# Patient Record
Sex: FEMALE | Race: WHITE | Employment: OTHER | URBAN - METROPOLITAN AREA
[De-identification: names, ages, dates, MRNs, and addresses within clinical notes are randomized per-mention and may not be internally consistent; named-entity substitution may affect disease eponyms.]

---

## 2017-01-04 DIAGNOSIS — I47.19 NARROW COMPLEX TACHYCARDIA (H): Primary | ICD-10-CM

## 2017-01-04 RX ORDER — DILTIAZEM HYDROCHLORIDE 240 MG/1
CAPSULE, COATED, EXTENDED RELEASE ORAL
Qty: 90 CAPSULE | Refills: 3 | Status: SHIPPED | OUTPATIENT
Start: 2017-01-04 | End: 2017-12-25

## 2017-04-08 ENCOUNTER — HOSPITAL ENCOUNTER (OUTPATIENT)
Facility: CLINIC | Age: 82
Setting detail: OBSERVATION
Discharge: HOME | End: 2017-04-09
Attending: EMERGENCY MEDICINE | Admitting: INTERNAL MEDICINE
Payer: MEDICARE

## 2017-04-08 DIAGNOSIS — K56.41 IMPACTED STOOL IN RECTUM (H): ICD-10-CM

## 2017-04-08 DIAGNOSIS — K59.00 CONSTIPATION: Primary | ICD-10-CM

## 2017-04-08 LAB
ANION GAP SERPL CALCULATED.3IONS-SCNC: 7 MMOL/L (ref 3–14)
BASOPHILS # BLD AUTO: 0.1 10E9/L (ref 0–0.2)
BASOPHILS NFR BLD AUTO: 0.4 %
BUN SERPL-MCNC: 15 MG/DL (ref 7–30)
CALCIUM SERPL-MCNC: 9 MG/DL (ref 8.5–10.1)
CHLORIDE SERPL-SCNC: 104 MMOL/L (ref 94–109)
CO2 SERPL-SCNC: 29 MMOL/L (ref 20–32)
CREAT SERPL-MCNC: 0.88 MG/DL (ref 0.52–1.04)
DIFFERENTIAL METHOD BLD: ABNORMAL
EOSINOPHIL # BLD AUTO: 0.1 10E9/L (ref 0–0.7)
EOSINOPHIL NFR BLD AUTO: 0.6 %
ERYTHROCYTE [DISTWIDTH] IN BLOOD BY AUTOMATED COUNT: 13.4 % (ref 10–15)
GFR SERPL CREATININE-BSD FRML MDRD: 60 ML/MIN/1.7M2
GLUCOSE SERPL-MCNC: 134 MG/DL (ref 70–99)
HCT VFR BLD AUTO: 43.9 % (ref 35–47)
HGB BLD-MCNC: 14.1 G/DL (ref 11.7–15.7)
IMM GRANULOCYTES # BLD: 0.1 10E9/L (ref 0–0.4)
IMM GRANULOCYTES NFR BLD: 0.5 %
LYMPHOCYTES # BLD AUTO: 1.2 10E9/L (ref 0.8–5.3)
LYMPHOCYTES NFR BLD AUTO: 9.3 %
MCH RBC QN AUTO: 30.1 PG (ref 26.5–33)
MCHC RBC AUTO-ENTMCNC: 32.1 G/DL (ref 31.5–36.5)
MCV RBC AUTO: 94 FL (ref 78–100)
MONOCYTES # BLD AUTO: 1 10E9/L (ref 0–1.3)
MONOCYTES NFR BLD AUTO: 7.8 %
NEUTROPHILS # BLD AUTO: 10.4 10E9/L (ref 1.6–8.3)
NEUTROPHILS NFR BLD AUTO: 81.4 %
NRBC # BLD AUTO: 0 10*3/UL
NRBC BLD AUTO-RTO: 0 /100
PLATELET # BLD AUTO: 242 10E9/L (ref 150–450)
POTASSIUM SERPL-SCNC: 3.7 MMOL/L (ref 3.4–5.3)
RBC # BLD AUTO: 4.68 10E12/L (ref 3.8–5.2)
SODIUM SERPL-SCNC: 140 MMOL/L (ref 133–144)
WBC # BLD AUTO: 12.8 10E9/L (ref 4–11)

## 2017-04-08 PROCEDURE — 25000132 ZZH RX MED GY IP 250 OP 250 PS 637: Mod: GY

## 2017-04-08 PROCEDURE — 80048 BASIC METABOLIC PNL TOTAL CA: CPT | Performed by: EMERGENCY MEDICINE

## 2017-04-08 PROCEDURE — 25000132 ZZH RX MED GY IP 250 OP 250 PS 637: Mod: GY | Performed by: EMERGENCY MEDICINE

## 2017-04-08 PROCEDURE — 85025 COMPLETE CBC W/AUTO DIFF WBC: CPT | Performed by: EMERGENCY MEDICINE

## 2017-04-08 PROCEDURE — A9270 NON-COVERED ITEM OR SERVICE: HCPCS | Mod: GY | Performed by: EMERGENCY MEDICINE

## 2017-04-08 PROCEDURE — A9270 NON-COVERED ITEM OR SERVICE: HCPCS | Mod: GY

## 2017-04-08 PROCEDURE — 99285 EMERGENCY DEPT VISIT HI MDM: CPT

## 2017-04-08 PROCEDURE — 93005 ELECTROCARDIOGRAM TRACING: CPT

## 2017-04-08 RX ORDER — MAGNESIUM CARB/ALUMINUM HYDROX 105-160MG
30 TABLET,CHEWABLE ORAL ONCE
Status: COMPLETED | OUTPATIENT
Start: 2017-04-08 | End: 2017-04-09

## 2017-04-08 RX ORDER — ADENOSINE 3 MG/ML
INJECTION, SOLUTION INTRAVENOUS
Status: DISCONTINUED
Start: 2017-04-08 | End: 2017-04-08 | Stop reason: WASHOUT

## 2017-04-08 RX ADMIN — Medication 286 ML: at 23:07

## 2017-04-08 RX ADMIN — DOCUSATE SODIUM 286 ML: 50 LIQUID ORAL at 20:42

## 2017-04-08 RX ADMIN — DOCUSATE SODIUM 286 ML: 50 LIQUID ORAL at 19:26

## 2017-04-08 RX ADMIN — DOCUSATE SODIUM 286 ML: 50 LIQUID ORAL at 23:07

## 2017-04-08 ASSESSMENT — ENCOUNTER SYMPTOMS
DYSURIA: 0
FEVER: 0
CONSTIPATION: 1
BLOOD IN STOOL: 0
HEMATURIA: 0

## 2017-04-08 NOTE — IP AVS SNAPSHOT
[facility] Observation Department    [address]    [facility] [address]    Phone:  [phone]                                       After Visit Summary   4/8/2017    Terese Nassar    MRN: [id number]           After Visit Summary Signature Page     I have received my discharge instructions, and my questions have been answered. I have discussed any challenges I see with this plan with the nurse or doctor.    ..........................................................................................................................................  Patient/Patient Representative Signature      ..........................................................................................................................................  Patient Representative Print Name and Relationship to Patient    ..................................................               ................................................  Date                                            Time    ..........................................................................................................................................  Reviewed by Signature/Title    ...................................................              ..............................................  Date                                                            Time

## 2017-04-08 NOTE — ED NOTES
Patient presents to the ED with constipation. Reports no BM x 1 week. Seen at urgent care earlier today and given something to take orally. Told if no results by tonight to come to ED.

## 2017-04-08 NOTE — IP AVS SNAPSHOT
MRN:[id number]                      After Visit Summary   4/8/2017    Terese Nassar    MRN: [id number]           Thank you!     Thank you for choosing [facility] for your care. Our goal is always to provide you with excellent care. Hearing back from our patients is one way we can continue to improve our services. Please take a few minutes to complete the written survey that you may receive in the mail after you visit. If you would like to speak to someone directly about your visit please contact Patient Relations at [phone]. Thank you!          Patient Information     Date Of Birth          8/7/1928        About your hospital stay     You were admitted on:  April 9, 2017 You last received care in the:  [facility] Observation Department    You were discharged on:  April 9, 2017        Reason for your hospital stay       Constipation. Received multiple stool softeners, laxatives and enemas with dramatic results. No stools on day of discharge.                  Who to Call     For medical emergencies, please call 911.  For non-urgent questions about your medical care, please call your primary care provider or clinic, [phone]          Attending Provider     Provider Specialty    Deshawn Lozano MD Emergency Medicine    [facility], Kodi Gonzales MD Internal Medicine       Primary Care Provider Office Phone # Fax #    Alyson Hector [phone]       [facility] [id number] [place] [phone]         When to contact your care team       Call your primary doctor if you have any of the following: temperature greater than 101, increased pain or profound constipation.                  After Care Instructions     Activity       Your activity upon discharge: activity as tolerated            Diet       Follow this diet upon discharge: Regular, encourage oral hydration                  Follow-up Appointments     Follow-up and  "recommended labs and tests        Follow up with primary care provider, Alyson Hector, within 7 days for hospital follow- up.  No follow up labs or test are needed.  Pt reports taking Miralax daily at home, recommend resuming Miralax in 1-2 days. If no bowel movement for 2 days, recommend increasing Miralax to twice daily until you have a bowel movement then resume daily dosing.   Encourage oral hydration                             Pending Results     No orders found for last 3 day(s).            Statement of Approval     Ordered          17 1118  I have reviewed and agree with all the recommendations and orders detailed in this document.  EFFECTIVE NOW     Approved and electronically signed by:  Mignon Su PA-C             Admission Information     Date & Time Provider Department Dept. Phone    2017 Kodi Amaya MD [facility] Observation Department [phone]      Your Vitals Were     Blood Pressure Pulse Temperature Respirations Height Weight    116/52 (BP Location: Right arm) 99 98.5  F (36.9  C) (Oral) 16 1.524 m (5') 54.9 kg (121 lb)    Pulse Oximetry BMI (Body Mass Index)                93% 23.63 kg/m2          MyChart Information     TrackR lets you send messages to your doctor, view your test results, renew your prescriptions, schedule appointments and more. To sign up, go to [email or web address] . Click on \"Log in\" on the left side of the screen, which will take you to the Welcome page. Then click on \"Sign up Now\" on the right side of the page.     You will be asked to enter the access code listed below, as well as some personal information. Please follow the directions to create your username and password.     Your access code is: [email or web address]  Expires: 2017 12:20 PM     Your access code will  in 90 days. If you need help or a new code, please call your [facility] clinic or [phone].        Care EveryWhere ID     This is your Care " EveryWhere ID. This could be used by other organizations to access your [facility] medical records  [id number]           Review of your medicines      CONTINUE these medicines which have NOT CHANGED        Dose / Directions    diltiazem 240 MG 24 hr capsule   Commonly known as:  CARDIZEM CD   Used for:  Narrow complex tachycardia (H)        Take 1 capsule daily   Quantity:  90 capsule   Refills:  3       hydrocortisone 2.5 % cream   Commonly known as:  ANUSOL-HC        Dose:  1 Application   Place 1 Application rectally 3 times daily as needed for itching   Refills:  0       MIRALAX powder   Generic drug:  polyethylene glycol        Dose:  1 capful   Take 17 g (1 capful) by mouth daily   Quantity:  510 g   Refills:  1       simvastatin 10 MG tablet   Commonly known as:  ZOCOR        Dose:  10 mg   Take 10 mg by mouth At Bedtime   Refills:  0                Protect others around you: Learn how to safely use, store and throw away your medicines at www.disposemymeds.org.             Medication List: This is a list of all your medications and when to take them. Check marks below indicate your daily home schedule. Keep this list as a reference.      Medications           Morning Afternoon Evening Bedtime As Needed    diltiazem 240 MG 24 hr capsule   Commonly known as:  CARDIZEM CD   Take 1 capsule daily   Last time this was given:  240 mg on 4/9/2017 11:34 AM                                hydrocortisone 2.5 % cream   Commonly known as:  ANUSOL-HC   Place 1 Application rectally 3 times daily as needed for itching                                MIRALAX powder   Take 17 g (1 capful) by mouth daily   Generic drug:  polyethylene glycol                                simvastatin 10 MG tablet   Commonly known as:  ZOCOR   Take 10 mg by mouth At Bedtime

## 2017-04-09 ENCOUNTER — APPOINTMENT (OUTPATIENT)
Dept: GENERAL RADIOLOGY | Facility: CLINIC | Age: 82
End: 2017-04-09
Attending: EMERGENCY MEDICINE
Payer: MEDICARE

## 2017-04-09 VITALS
RESPIRATION RATE: 16 BRPM | OXYGEN SATURATION: 93 % | HEIGHT: 60 IN | HEART RATE: 99 BPM | BODY MASS INDEX: 23.75 KG/M2 | TEMPERATURE: 97.6 F | SYSTOLIC BLOOD PRESSURE: 141 MMHG | DIASTOLIC BLOOD PRESSURE: 65 MMHG | WEIGHT: 121 LBS

## 2017-04-09 PROBLEM — K59.00 CONSTIPATION: Status: ACTIVE | Noted: 2017-04-09

## 2017-04-09 LAB
INTERPRETATION ECG - MUSE: NORMAL
LACTATE BLD-SCNC: 2.1 MMOL/L (ref 0.7–2.1)
LACTATE BLD-SCNC: 2.2 MMOL/L (ref 0.7–2.1)

## 2017-04-09 PROCEDURE — G0378 HOSPITAL OBSERVATION PER HR: HCPCS

## 2017-04-09 PROCEDURE — 96360 HYDRATION IV INFUSION INIT: CPT

## 2017-04-09 PROCEDURE — 83605 ASSAY OF LACTIC ACID: CPT | Performed by: INTERNAL MEDICINE

## 2017-04-09 PROCEDURE — 25000128 H RX IP 250 OP 636: Performed by: INTERNAL MEDICINE

## 2017-04-09 PROCEDURE — 83605 ASSAY OF LACTIC ACID: CPT | Performed by: PHYSICIAN ASSISTANT

## 2017-04-09 PROCEDURE — 25000132 ZZH RX MED GY IP 250 OP 250 PS 637: Mod: GY | Performed by: INTERNAL MEDICINE

## 2017-04-09 PROCEDURE — 36415 COLL VENOUS BLD VENIPUNCTURE: CPT | Performed by: INTERNAL MEDICINE

## 2017-04-09 PROCEDURE — 96361 HYDRATE IV INFUSION ADD-ON: CPT

## 2017-04-09 PROCEDURE — 25000132 ZZH RX MED GY IP 250 OP 250 PS 637: Mod: GY | Performed by: EMERGENCY MEDICINE

## 2017-04-09 PROCEDURE — A9270 NON-COVERED ITEM OR SERVICE: HCPCS | Mod: GY | Performed by: INTERNAL MEDICINE

## 2017-04-09 PROCEDURE — 99236 HOSP IP/OBS SAME DATE HI 85: CPT | Performed by: INTERNAL MEDICINE

## 2017-04-09 PROCEDURE — 74020 XR ABDOMEN 2 VW: CPT

## 2017-04-09 PROCEDURE — 36415 COLL VENOUS BLD VENIPUNCTURE: CPT | Performed by: PHYSICIAN ASSISTANT

## 2017-04-09 RX ORDER — POLYETHYLENE GLYCOL 3350 17 G/17G
1 POWDER, FOR SOLUTION ORAL DAILY
Qty: 510 G | Refills: 1 | COMMUNITY
Start: 2017-04-09

## 2017-04-09 RX ORDER — DILTIAZEM HYDROCHLORIDE 240 MG/1
240 CAPSULE, COATED, EXTENDED RELEASE ORAL DAILY
Status: DISCONTINUED | OUTPATIENT
Start: 2017-04-09 | End: 2017-04-09 | Stop reason: HOSPADM

## 2017-04-09 RX ORDER — ONDANSETRON 2 MG/ML
4 INJECTION INTRAMUSCULAR; INTRAVENOUS EVERY 6 HOURS PRN
Status: DISCONTINUED | OUTPATIENT
Start: 2017-04-09 | End: 2017-04-09 | Stop reason: HOSPADM

## 2017-04-09 RX ORDER — NALOXONE HYDROCHLORIDE 0.4 MG/ML
.1-.4 INJECTION, SOLUTION INTRAMUSCULAR; INTRAVENOUS; SUBCUTANEOUS
Status: DISCONTINUED | OUTPATIENT
Start: 2017-04-09 | End: 2017-04-09 | Stop reason: HOSPADM

## 2017-04-09 RX ORDER — ACETAMINOPHEN 325 MG/1
650 TABLET ORAL EVERY 4 HOURS PRN
Status: DISCONTINUED | OUTPATIENT
Start: 2017-04-09 | End: 2017-04-09 | Stop reason: HOSPADM

## 2017-04-09 RX ORDER — DILTIAZEM HYDROCHLORIDE 30 MG/1
60 TABLET, FILM COATED ORAL ONCE
Status: COMPLETED | OUTPATIENT
Start: 2017-04-09 | End: 2017-04-09

## 2017-04-09 RX ORDER — ONDANSETRON 4 MG/1
4 TABLET, ORALLY DISINTEGRATING ORAL EVERY 6 HOURS PRN
Status: DISCONTINUED | OUTPATIENT
Start: 2017-04-09 | End: 2017-04-09 | Stop reason: HOSPADM

## 2017-04-09 RX ORDER — SIMVASTATIN 10 MG
10 TABLET ORAL AT BEDTIME
Status: DISCONTINUED | OUTPATIENT
Start: 2017-04-09 | End: 2017-04-09 | Stop reason: HOSPADM

## 2017-04-09 RX ADMIN — SODIUM CHLORIDE 1000 ML: 9 INJECTION, SOLUTION INTRAVENOUS at 02:17

## 2017-04-09 RX ADMIN — MAGNESIUM CITRATE 30 ML: 1.75 LIQUID ORAL at 00:26

## 2017-04-09 RX ADMIN — DILTIAZEM HYDROCHLORIDE 60 MG: 30 TABLET, FILM COATED ORAL at 02:12

## 2017-04-09 RX ADMIN — DILTIAZEM HYDROCHLORIDE 240 MG: 240 CAPSULE, EXTENDED RELEASE ORAL at 11:34

## 2017-04-09 NOTE — PLAN OF CARE
Problem: Discharge Planning  Goal: Discharge Planning (Adult, OB, Behavioral, Peds)  Patient abdomen still distended after many episodes of loose stools. Patient tried to void but was only able to void out 75 mls. Bladder scan showed 405mls. Patient was straight cathed for 700. Urine was norberto in color, slighlty cloudy.

## 2017-04-09 NOTE — PROGRESS NOTES
[facility]    Sepsis Evaluation Progress Note    Date of Service: 04/09/2017    I was called to see Terese Nassar due to abnormal vital signs triggering the Sepsis SIRS screening alert. She is not known to have an infection.     Physical Exam    Vital Signs:  Temp: 96.2  F (35.7  C) Temp src: Temporal BP: 147/67 Pulse: 99 Heart Rate: 88 Resp: 16 SpO2: 97 % O2 Device: None (Room air)      Lab:  Lactic Acid   Date Value Ref Range Status   04/09/2017 2.2 (H) 0.7 - 2.1 mmol/L Final     Patient just examined in ED  The patient is at baseline mental status.    The rest of their physical exam is significant for NSR, lungs clear, no abdominal tenderness, A&O.    Assessment and Plan    The SIRS and exam findings are likely due to previous run of SVT and mild hypovolemia, there is no sign of sepsis at this time.  Patient has intermittent runs of SVT chronically which may trigger the sepsis protocol.  She is here for constipation and has no sign of infection.    Disposition: The patient will remain on the current unit. We will continue to monitor this patient closely.    Kodi Amaya MD

## 2017-04-09 NOTE — PHARMACY-ADMISSION MEDICATION HISTORY
Admission medication history interview status for this patient is complete. See [facility] admission navigator for allergy information, prior to admission medications and immunization status.     Medication history interview source(s):Patient  Medication history resources (including written lists, pill bottles, clinic record):[facility] list  Primary pharmacy:[facility]    Changes made to [facility] medication list:  Added: None  Deleted: prilosec  Changed: None    Actions taken by pharmacist (provider contacted, etc):None     Additional medication history information:None    Medication reconciliation/reorder completed by provider prior to medication history? No    For patients on insulin therapy: No (Yes/No)  Lantus/levemir/NPH/Mix 70/30 dose:  _____   in AM/PM  or twice daily   Sliding scale Novolog Y/N  If Yes, do you have a baseline novolog pre-meal dose:  ______units with meals   Patients eat three meals a day:   Y/N     Any Barriers to therapy:  cost of medications/comfortable with giving injections (if applicable)/ comfortable and confident with current diabetes regimen       Prior to Admission medications    Medication Sig Last Dose Taking? Auth Provider   hydrocortisone (ANUSOL-HC) 2.5 % cream Place 1 Application rectally 3 times daily as needed for itching Past Week at Unknown time Yes Reported, Patient   diltiazem (CARDIZEM CD) 240 MG 24 hr capsule Take 1 capsule daily 4/7/2017 at Unknown time Yes Kane Galaviz MD   simvastatin (ZOCOR) 10 MG tablet Take 10 mg by mouth At Bedtime 4/7/2017 at Unknown time Yes Unknown, Entered By History

## 2017-04-09 NOTE — PLAN OF CARE
Problem: Discharge Planning  Goal: Discharge Planning (Adult, OB, Behavioral, Peds)  OBSERVATION patient END time: 1320

## 2017-04-09 NOTE — PLAN OF CARE
Problem: Discharge Planning  Goal: Discharge Planning (Adult, OB, Behavioral, Peds)  Patient triggered sepsis lactic acid came back at 2.2. IV bolus started. Tele monitoring in place. Tele tech reports hr in the 150's - 160's. BP 1518/1131. Hospitalist notified. Cardizem given at 0212.

## 2017-04-09 NOTE — PLAN OF CARE
Problem: Discharge Planning  Goal: Discharge Planning (Adult, OB, Behavioral, Peds)  Outcome: No Change  ROOM # 0120     Living Situation (if not independent, order SW consult): Ind senior living  Facility name: [facility]     Activity level at baseline: Ind with walker  Activity level on admit: SBA with walker     Is patient a falls risk? Yes  Reason for falls risk:  Mobility  Falls armband on? YES,   Within Arm's Reach? Yes   Bed alarm turned on?   No,   Personal alarm in place and turned on?   No,      Patient registered to observation; given Patient Bill of Rights; given the opportunity to ask questions about observation status and their plan of care.  Patient has been oriented to the observation room, bathroom and call light is in place.     : daughter

## 2017-04-09 NOTE — PLAN OF CARE
Problem: Discharge Planning  Goal: Discharge Planning (Adult, OB, Behavioral, Peds)  PRIMARY DIAGNOSIS: constipation   Primary Symptoms: constipation - 1wk, fatique      OUTPATIENT/OBSERVATION GOALS TO BE MET BEFORE DISCHARGE:      1. Orthostatic symptoms (BP decrease or HR increase with patient upright)? N/A  2. Vital Signs stable? Yes- pulse remains in 80s  3. Documented urine output: yes  4. Tolerating PO fluid: yes  5. Symptoms improved: constipation resolved loose stools overnight. None on day shift  6. Labs WNL? Yes- lactic 2.1  7. ADLs back to baseline? Yes  8. Activity and level of assistance: Up with standby assistance and walker  9. Pain status: Pain free.  10. Barriers to discharge noted No      Interpretation of rhythm per telemetry tech: NSR 80s     Patient has not had any stools since overnight. Minimal hand pain from arthritis. Moving SBA with walker. Was able to ambulate halls this a.m. Will discharge at 3 with daughter.

## 2017-04-09 NOTE — PLAN OF CARE
Problem: Discharge Planning  Goal: Discharge Planning (Adult, OB, Behavioral, Peds)  Outcome: Improving  PRIMARY DIAGNOSIS: constipation   Primary Symptoms: constipation - 1wk, fatique     OUTPATIENT/OBSERVATION GOALS TO BE MET BEFORE DISCHARGE:     1. Orthostatic symptoms (BP decrease or HR increase with patient upright)?  N/A  2. Vital Signs stable? No HR ranging from 150-160's at times. BP elevated during that time.   3. Documented urine output: No  4. Tolerating PO fluid: only had water since arriving.   5. Symptoms improved: constipation resolved having multiple loose stools  6. Labs WNL? No- sepsis protocol flagged, LA ordered  7. ADLs back to baseline?  Yes  8. Activity and level of assistance: Up with standby assistance and walker  9. Pain status: Pain free.  10. Barriers to discharge noted No     Interpretation of rhythm per telemetry tech: Tele reports SR with frequent PVC's     Patient is alert and oriented x4, up with SBA to commode. Patient came into ED d/t constipation, 3 pink lady enemas given in ED. On arrival to unit patient started have loose stools. Bruise noted on right shoulder and lower back. Patient reports falling 2 weeks ago. IV site saline locked. Tele monitoring in place. Will continue t monitor, assess, and offer supportive cares.

## 2017-04-09 NOTE — H&P
[facility]  Hospitalist Admission Note  Name: Terese Nassar    MRN: [id number]  YOB: 1928    Age: 88 year old  Date of admission: 4/8/2017  Primary care provider: Alyson Hector    Chief Complaint:  Constipation    Assessment and Plan:   1.   Constipation: chronic issue for patient.  Last BM one week ago.  Tried milk of magnesia at home without relief.  3 pink lady enemas and attempted fecal disimpaction in ED without much output, however stools felt in rectal vault  Xray without sign of SBO and no abdominal pain, nausea, or vomiting so feel comfortable continuing to give po agents.  Just given milk of magnesia in ED.  Ideally we can get the stool cleaned out and home later today.  -ordered 2L of Golytely to be given if milk of magnesia not effective in the next hour or two.  If substantial output do not need to complete all 2 liters  -prn fleet enema also available, but can wait until morning as she just had 3 of them    2.   Hemorrhoids: small amount of rectal bleeding recently and hemorrhoids on exam.  Has hydrocortisone cream prn.  Not having significant blood loss and hemoglobin in normal range.  -continue pta topical hydrocortisone cream prn for rectal irritation    3.   Paroxysmal SVT: has had frequent episodes of SVT for at least 5 years.  Has been on metoprolol in the past and now on 240mg daily dilitiazem CR.  SVT to rates of 140s here that other than some palpitations are asymptomatic.  Usually happen up to once per day and last up to an hour.  Per care everywhere this has been ongoing for years and has been seen by cardiology.  Per patient they have said she does not have to worry about this.  I cannot find this last note.  Spontaneously converted in ED with valsalva for BM.  Did not take her diltiazem today as likely etiology for more frequent episodes today.  -give dose of 60mg po short acting diltiazem on admission then resume her 240mg diltiazem CR in morning  -will  monitor on telemetry to see how frequent these episodes are occurring.  They seem to be self terminating and asymptomatic.  Will have her valsalva if not self-terminating and back up would be adenosine if needed  -have recommended she see her cardiologist again after discharge as it has been a year and these episodes are happening every day     4.   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    5.   HTN: SBP here 120's.  Continue pta diltiazem CR 240mg daily.    6.   HLD: continue pta simvastatin    7.   GERD: continue pta prilosec    DVT Prophylaxis: Low Risk/Ambulatory with no VTE prophylaxis indicated, ambulate, observation patient  Code Status: DNR / DNI  FEN: regular diet  Discharge Dispo: home  Estimated Disch Date / # of Days until Disch: admit to observation for constipation.  Likely discharge within 24-48 hrs      History of Present Illness:  Terese Nassar is a 88 year old female with PMH including HTN, HLD, constipation, paroxysmal SVT, mild MR/AR, and GERD who presents with constipation.  She has not had a bowel movement in one week.  She has been trying to go and was diagnosed with hemorrhoids earlier this week by her pcp.  She was given hydrocortisone for rectal irritation which is helping.  She denies any abdominal pain with this.  No nausea or vomiting.  Small amount of bright red blood once per rectum once in awhile.  She has not felt like eating much because she feels full.  Denies any diarrhea.  Does have hx of constipation in the past.  No recent unintentional weight loss.  She does admit to frequent episodes of a rapid heart rate over the past few years.  Her HR is always fast when she is anxious or in pain.  It goes away by itself usually within one hour.  Once in awhile will have palpitations, but does not have chest pain, sob, or light headedness with these episodes.  Has seen cardiology for this and she is on diltiazem which she did not take today.    History obtained from patient,  medical record, and from Dr. Lozano in the emergency department.  [place] lady enema x 3 withotu significant stool output.  Disimpaction was also attempted without good results.  Stools was felt in rectal vault.  To receive milk of mag.  Admit to observation to help with constipation.  Abdominal xray requested, does not appear to have SBO on my read just a large stool burden.     Past Medical History reviewed:  Past Medical History:   Diagnosis Date     Basal cell carcinoma 2014    on scalp     Cataract      GERD (gastroesophageal reflux disease)      HLD (hyperlipidemia)      Hypertension      Impaired fasting glucose 2005     Mitral valve insufficiency     SBE prophylaxis per PMD     Narrow complex tachycardia (H) 2010     Palpitations      Past Surgical History reviewed:  Past Surgical History:   Procedure Laterality Date     CARDIAC SURGERY       ESOPHAGOSCOPY, GASTROSCOPY, DUODENOSCOPY (EGD), COMBINED N/A 10/16/2016    Procedure: COMBINED ESOPHAGOSCOPY, GASTROSCOPY, DUODENOSCOPY (EGD), BIOPSY SINGLE OR MULTIPLE;  Surgeon: Kane Salazar MD;  Location:  GI     Social History reviewed:  Social History   Substance Use Topics     Smoking status: Former Smoker     Smokeless tobacco: Former User     Alcohol use Yes     Social History     Social History Narrative     Family History reviewed:  Family History   Problem Relation Age of Onset     DIABETES Mother      Unknown/Adopted Father      Allergies:  Allergies   Allergen Reactions     Sulfa Drugs Nausea     Medications:    (Not in a hospital admission)  Review of Systems:  A Comprehensive greater than 10 system review of systems was carried out.  Pertinent positives and negatives are noted above.  Otherwise negative.     Physical Exam:  Blood pressure 113/70, pulse 99, temperature 97.6  F (36.4  C), resp. rate 14, SpO2 98 %.  Wt Readings from Last 1 Encounters:   10/15/16 53.1 kg (117 lb)     Exam:  Constitutional: Awake, NAD  Eyes: sclera white   HEENT:  atraumatic, MMM  Respiratory: no respiratory distress, lungs cta bilaterally, no crackles or wheeze  Cardiovascular: RRR.  1/6 JASMINA heard at RUSB  GI: soft, non-tender, not distended, bowel sounds present  Skin: no rash or lesions, acyanotic  Musculoskeletal/extremities: atraumatic, no major deformities. Trace to 1+ bilateral pedal edema  Neurologic: A&O, speech clear, strength and light touch sensation grossly normal  Psychiatric: calm, cooperative, normal affect    Lab and imaging data personally reviewed:  Labs:    Recent Labs  Lab 04/08/17 2029   WBC 12.8*   HGB 14.1   HCT 43.9   MCV 94          Recent Labs  Lab 04/08/17 2029      POTASSIUM 3.7   CHLORIDE 104   CO2 29   ANIONGAP 7   *   BUN 15   CR 0.88   GFRESTIMATED 60*   GFRESTBLACK 73   IWONA 9.0       EKG:   Narrow complex SVT with 's    Imaging:  Recent Results (from the past 24 hour(s))   XR Abdomen 2 Views    Narrative    XR ABDOMEN 2 VW   4/9/2017 12:24 AM     INDICATION: Abdominal pain.    COMPARISON: 2/25/2013.      Impression    IMPRESSION: Nonspecific bowel gas pattern. No evidence of bowel  obstruction. No free air. Small presumed phleboliths in the pelvis.  Stable exostosis or hypertrophic spur in the right innominate bone  above the acetabulum.    MD Kodi SOTO MD  Hospitalist  [facility]

## 2017-04-09 NOTE — PROGRESS NOTES
Reviewed her telemetry from overnight.  2 other episodes of SVT lasting each about 20-30 minutes with rates 140s.  Both occurred while up to bathroom to have a BM.  Both self terminated back to NSR with HR 70s.  Hopefully with her diltiazem on board there will be less episodes, but per patient this happens every day.

## 2017-04-09 NOTE — DISCHARGE SUMMARY
[facility] Outpatient / Observation Unit  Discharge Summary        Terese Nassar MRN# [id number]   YOB: 1928 Age: 88 year old     Date of Admission:  4/8/2017  Date of Discharge:  4/9/2017  Admitting Physician:  Kodi Amaya MD  Discharge Physician: Mignon Su PA-C  Discharging Service: Hospitalist      Primary Provider: Alyson Hector  Primary Care Physician Phone Number: [phone]         Primary Discharge Diagnoses:    Terese Nassar was admitted on 4/8/2017 for concerns of constipation.    1. Constipation - resolved. Received multiple stool softeners, laxatives and enemas with dramatic results. No stools on day of discharge. No abdominal pain, tolerating PO intake. Pt reports taking Miralax daily at home, recommend resuming in 1-2 days. If she does not have a bowel movement for 2 days, recommend taking Miralax twice daily until she has a bowel movement then resume daily dosing. Encourage PO hydration.   2. Paroxysmal SVT - chronic issue for her, typically spontaneously resolves. On Diltiazem, continue. Recommend follow up with cardiologist regarding frequency of SVT        Secondary Discharge Diagnoses:     Past Medical History:   Diagnosis Date     Basal cell carcinoma 2014    on scalp     Cataract      GERD (gastroesophageal reflux disease)      HLD (hyperlipidemia)      Hypertension      Impaired fasting glucose 2005     Mitral valve insufficiency     SBE prophylaxis per PMD     Narrow complex tachycardia (H) 2010     Palpitations                 Code Status:      DNR / DNI        Brief Hospital Summary:        Reason for your hospital stay       Constipation. Received multiple stool softeners, laxatives and enemas   with dramatic results. No stools on day of discharge.                    Please refer to initial admission history and physical for further details.   Briefly, Terese Nassar was admitted on 4/8/2017 for concerns of constipation. Work up in ED did not show any  evidence of bowel obstruction. Pt was registered to the Observation Unit for continued supportive therapy for constipation.     Pt was resuscitated with IV fluids and continued on supportive measures including anti-emetics and pain control as needed. She was started on a bowel regimen with dramatic results. Labs were reviewed and significant results addressed.  She had multiple loose stools overnight but on the day of discharge, she had no further bowel movement. She also has several episodes of self terminating SVT while here, which is chronic for her, asymptomatic. Pt was able to tolerate PO intake. Vitals were stable, without evidence of orthostasis. Medications were reviewed and adjustments made as necessary. Pt is instructed to follow up as below.            Significant Lab During Hospitalization:        Recent Labs  Lab 04/08/17 2029   WBC 12.8*   HGB 14.1   HCT 43.9   MCV 94          Recent Labs  Lab 04/08/17 2029      POTASSIUM 3.7   CHLORIDE 104   CO2 29   ANIONGAP 7   *   BUN 15   CR 0.88   GFRESTIMATED 60*   GFRESTBLACK 73   IWONA 9.0       Recent Labs  Lab 04/09/17  1029 04/09/17  0140   LACT 2.1 2.2*                Significant Imaging During Hospitalization:      Recent Results (from the past 48 hour(s))   XR Abdomen 2 Views    Narrative    XR ABDOMEN 2 VW   4/9/2017 12:24 AM     INDICATION: Abdominal pain.    COMPARISON: 2/25/2013.      Impression    IMPRESSION: Nonspecific bowel gas pattern. No evidence of bowel  obstruction. No free air. Small presumed phleboliths in the pelvis.  Stable exostosis or hypertrophic spur in the right innominate bone  above the acetabulum.    JEAN CANTRELL MD              Pending Results:        Unresulted Labs Ordered in the Past 30 Days of this Admission     No orders found for last 61 day(s).              Consultations This Hospital Stay:      No consultations were requested during this admission         Discharge Instructions and Follow-Up:       Follow-up Appointments     Follow-up and recommended labs and tests        Follow up with primary care provider, Alyson Hector, within 7 days   for hospital follow- up.  No follow up labs or test are needed.  Pt reports taking Miralax daily at home, recommend resuming Miralax in 1-2   days. If no bowel movement for 2 days, recommend increasing Miralax to   twice daily until you have a bowel movement then resume daily dosing.   Encourage oral hydration                  Pt instructed to follow up with PCP in 7 days.   Follow-up Labs None        Discharge Disposition:      Discharged to home         Discharge Medications:        Current Discharge Medication List      CONTINUE these medications which have NOT CHANGED    Details   hydrocortisone (ANUSOL-HC) 2.5 % cream Place 1 Application rectally 3 times daily as needed for itching      polyethylene glycol (MIRALAX) powder Take 17 g (1 capful) by mouth daily  Qty: 510 g, Refills: 1    Comments: Start in 1-2 days. If no bowel movement for 2 days, take Miralax twice daily until you have a bowel movement then return to once daily  Associated Diagnoses: Constipation      diltiazem (CARDIZEM CD) 240 MG 24 hr capsule Take 1 capsule daily  Qty: 90 capsule, Refills: 3    Associated Diagnoses: Narrow complex tachycardia (H)      simvastatin (ZOCOR) 10 MG tablet Take 10 mg by mouth At Bedtime                 Allergies:         Allergies   Allergen Reactions     Sulfa Drugs Nausea           Condition and Physical on Discharge:      Discharge condition: Stable   Vitals: Blood pressure 115/55, pulse 99, temperature 98.5  F (36.9  C), temperature source Oral, resp. rate 17, height 1.524 m (5'), weight 54.9 kg (121 lb), SpO2 94 %.  121 lbs 0 oz      GENERAL:  Comfortable.  PSYCH: pleasant, oriented, No acute distress.  HEART:  RRR.  LUNGS:  Normal Respiratory effort  EXTREMITIES:  Able to ambulate independently  SKIN:  Dry to touch, No rash, wound or ulcerations.  NEUROLOGIC:   Grossly intact    Mignon Su PA-C

## 2017-04-09 NOTE — PLAN OF CARE
Problem: Discharge Planning  Goal: Discharge Planning (Adult, OB, Behavioral, Peds)  Outcome: Improving  PRIMARY DIAGNOSIS: constipation   Primary Symptoms: constipation - 1wk, fatique     OUTPATIENT/OBSERVATION GOALS TO BE MET BEFORE DISCHARGE:     1. Orthostatic symptoms (BP decrease or HR increase with patient upright)?  N/A  2. Vital Signs stable? No HR ranging from 150-160's at times. BP elevated during that time.   3. Documented urine output: No  4. Tolerating PO fluid: only had water since arriving.   5. Symptoms improved: constipation resolved having multiple loose stools- fecal collection bag in place  6. Labs WNL? No- sepsis protocol flagged, LA 2.2, IV bolus ordered.   7. ADLs back to baseline?  Yes  8. Activity and level of assistance: Up with standby assistance and walker  9. Pain status: Pain free.  10. Barriers to discharge noted No     Interpretation of rhythm per telemetry tech: Tele reports SR with frequent PVC's     Patient is alert and oriented x4, up with SBA to commode. Patient has had several loose incontinent stools this shift. Barrier cream applied to bottom d/t redness. Fecal collection bag inserted around 0430 d/t patient's bottom becoming very red and painful.  IV bolus infusing. Tele monitoring in place. HR at times will jump to 150- 160's when OOB.  Will continue to monitor, assess, and offer supportive cares.

## 2017-04-09 NOTE — PLAN OF CARE
Problem: Discharge Planning  Goal: Discharge Planning (Adult, OB, Behavioral, Peds)  Patient's After Visit Summary was reviewed with patient  Patient verbalized understanding of After Visit Summary, recommended follow up and was given an opportunity to ask questions.   Discharge medications sent home with patient/family: n/a  Discharged with son in law

## 2017-04-09 NOTE — PLAN OF CARE
Problem: Discharge Planning  Goal: Discharge Planning (Adult, OB, Behavioral, Peds)  PRIMARY DIAGNOSIS: constipation   Primary Symptoms: constipation - 1wk, fatique      OUTPATIENT/OBSERVATION GOALS TO BE MET BEFORE DISCHARGE:      1. Orthostatic symptoms (BP decrease or HR increase with patient upright)? N/A  2. Vital Signs stable? HR in 130s from 7-8 a.m. Decreased down to 80s  3. Documented urine output: yes-had to be straight cath at 0700  4. Tolerating PO fluid: yes  5. Symptoms improved: constipation resolved having multiple loose stools  6. Labs WNL? No- sepsis protocol flagged, LA 2.2, IV bolus given  7. ADLs back to baseline? Yes  8. Activity and level of assistance: Up with standby assistance and walker  9. Pain status: Pain free.  10. Barriers to discharge noted No      Interpretation of rhythm per telemetry tech: NSR in 130s from 0700 to 0800, decreased to 80s afterwards     Patient in 130s from 0700 to 0800. Patient asymptomatic and denies chest pain, SOB, and dizziness. States this is her normal. Decreased down to 80s. Patient has not had any stools since night shift. Did require to be cathed at 0700. 700 out.

## 2017-12-25 DIAGNOSIS — I47.19 NARROW COMPLEX TACHYCARDIA (H): ICD-10-CM

## 2017-12-25 RX ORDER — DILTIAZEM HYDROCHLORIDE 240 MG/1
CAPSULE, COATED, EXTENDED RELEASE ORAL
Qty: 15 CAPSULE | Refills: 0 | Status: SHIPPED | OUTPATIENT
Start: 2017-12-25 | End: 2018-01-12

## 2018-01-12 ENCOUNTER — OFFICE VISIT (OUTPATIENT)
Dept: CARDIOLOGY | Facility: CLINIC | Age: 83
End: 2018-01-12
Payer: COMMERCIAL

## 2018-01-12 VITALS
BODY MASS INDEX: 24.27 KG/M2 | HEART RATE: 60 BPM | HEIGHT: 59 IN | DIASTOLIC BLOOD PRESSURE: 64 MMHG | WEIGHT: 120.4 LBS | SYSTOLIC BLOOD PRESSURE: 106 MMHG

## 2018-01-12 DIAGNOSIS — I47.10 PAROXYSMAL SUPRAVENTRICULAR TACHYCARDIA (H): Primary | ICD-10-CM

## 2018-01-12 DIAGNOSIS — I10 ESSENTIAL HYPERTENSION: ICD-10-CM

## 2018-01-12 DIAGNOSIS — E78.2 MIXED HYPERLIPIDEMIA: ICD-10-CM

## 2018-01-12 DIAGNOSIS — I34.0 NON-RHEUMATIC MITRAL REGURGITATION: ICD-10-CM

## 2018-01-12 DIAGNOSIS — I47.19 NARROW COMPLEX TACHYCARDIA (H): ICD-10-CM

## 2018-01-12 PROCEDURE — 99214 OFFICE O/P EST MOD 30 MIN: CPT | Performed by: INTERNAL MEDICINE

## 2018-01-12 PROCEDURE — 93000 ELECTROCARDIOGRAM COMPLETE: CPT | Performed by: INTERNAL MEDICINE

## 2018-01-12 RX ORDER — DILTIAZEM HYDROCHLORIDE 240 MG/1
CAPSULE, COATED, EXTENDED RELEASE ORAL
Qty: 90 CAPSULE | Refills: 3 | Status: SHIPPED | OUTPATIENT
Start: 2018-01-12 | End: 2019-01-03

## 2018-01-12 NOTE — MR AVS SNAPSHOT
After Visit Summary   1/12/2018    Terese Nassar    MRN: [id number]           Patient Information     Date Of Birth          8/7/1928        Visit Information        Provider Department      1/12/2018 9:45 AM Kane Galaviz MD [facility]        Today's Diagnoses     Paroxysmal supraventricular tachycardia (H)    -  1    Non-rheumatic mitral regurgitation        Essential hypertension        Mixed hyperlipidemia        Narrow complex tachycardia (H)           Follow-ups after your visit        Additional Services     Follow-Up with Cardiac Advanced Practice Provider       PSVT                  Future tests that were ordered for you today     Open Future Orders        Priority Expected Expires Ordered    Holter Monitor 24 hour - Adult Routine 1/19/2018 1/12/2019 1/12/2018    Follow-Up with Cardiac Advanced Practice Provider Routine 1/19/2018 1/12/2019 1/12/2018            Who to contact     If you have questions or need follow up information about today's clinic visit or your schedule please contact [facility] directly at [phone].  Normal or non-critical lab and imaging results will be communicated to you by [employer]hart, letter or phone within 4 business days after the clinic has received the results. If you do not hear from us within 7 days, please contact the clinic through [employer]t or phone. If you have a critical or abnormal lab result, we will notify you by phone as soon as possible.  Submit refill requests through [employer] or call your pharmacy and they will forward the refill request to us. Please allow 3 business days for your refill to be completed.          Additional Information About Your Visit        MyChart Information     [employer] lets you send messages to your doctor, view your test results, renew your prescriptions, schedule appointments and more. To sign up, go to [email or web address] . Click on  "\"Log in\" on the left side of the screen, which will take you to the Welcome page. Then click on \"Sign up Now\" on the right side of the page.     You will be asked to enter the access code listed below, as well as some personal information. Please follow the directions to create your username and password.     Your access code is: [email or web address]  Expires: 2018 10:42 AM     Your access code will  in 90 days. If you need help or a new code, please call your [facility] clinic or [phone].        Care EveryWhere ID     This is your Care EveryWhere ID. This could be used by other organizations to access your [facility] medical records  [id number]        Your Vitals Were     Pulse Height Breastfeeding? BMI (Body Mass Index)          60 1.499 m (4' 11\") No 24.32 kg/m2         Blood Pressure from Last 3 Encounters:   18 106/64   17 141/65   10/16/16 152/84    Weight from Last 3 Encounters:   18 54.6 kg (120 lb 6.4 oz)   17 54.9 kg (121 lb)   10/15/16 53.1 kg (117 lb)              We Performed the Following     EKG 12-lead complete w/read - Clinics          Where to get your medicines      These medications were sent to [facility] [id number] [facility] [address] [place] [id number]     Phone:  [phone]     diltiazem 240 MG 24 hr capsule          Primary Care Provider Office Phone # Fax #    Hetal Trujillo [phone]       [facility] [address] [facility] [address]        Equal Access to Services     FREDA SMITH AH: Olayinka Crespo, eber robert, scott shea. So [facility] [phone].    ATENCIÓN: Si habla español, tiene a zaragoza disposición servicios gratuitos de asistencia lingüística. Llame al [phone].    We comply with applicable federal civil rights laws and [place] laws. We do not discriminate on the basis of race, color, national origin, age, " disability, sex, sexual orientation, or gender identity.            Thank you!     Thank you for choosing [facility]  for your care. Our goal is always to provide you with excellent care. Hearing back from our patients is one way we can continue to improve our services. Please take a few minutes to complete the written survey that you may receive in the mail after your visit with us. Thank you!             Your Updated Medication List - Protect others around you: Learn how to safely use, store and throw away your medicines at www.disposemymeds.org.          This list is accurate as of: 1/12/18 10:42 AM.  Always use your most recent med list.                   Brand Name Dispense Instructions for use Diagnosis    diltiazem 240 MG 24 hr capsule    CARDIZEM CD    90 capsule    Take 1 capsule daily    Narrow complex tachycardia (H)       hydrocortisone 2.5 % cream    ANUSOL-HC     Place 1 Application rectally 3 times daily as needed for itching        MIRALAX powder   Generic drug:  polyethylene glycol     510 g    Take 17 g (1 capful) by mouth daily    Constipation       simvastatin 10 MG tablet    ZOCOR     Take 10 mg by mouth At Bedtime

## 2018-01-12 NOTE — PROGRESS NOTES
HISTORY OF PRESENT ILLNESS:  I got together with Terese Nassar today.  She is a very bright and relatively active and vivacious 89-year-old female.  She is younger than her years physically and mentally.  She has had a history of documented supraventricular tachycardia.  The last I saw her was in 11/2015 -- 3 years ago.  She has not had significant heart disease otherwise.  In the past couple of years, she denies chest pain, palpitations, dizziness, syncope, shortness of breath, orthopnea or lower leg edema.  Quite honestly that sounded quite wonderful.  Her diltiazem was CD at 240 mg a day, which she feels quite comfortable with and has been delighted with how stable she is.  The only other significant medicine is simvastatin 10 mg at bedtime.      Just to show that nothing is ever as easy as you think, she came through the door at our clinic today with a heart rate of 60 and a blood pressure of 106/60.  Twenty minutes later when I came in for my interview and felt her pulse, she was tachycardic at a heart rate of 140.  An EKG confirmed a supraventricular tachycardia at 140 beats a minute with no significant EKG changes otherwise, and she was totally asymptomatic.  Although I was somewhat tempted to ignore the whole thing, I did order a 48-hour Holter monitor to get some clues about how frequently she is having this tachyarrhythmia, even though it is profoundly well tolerated.        She said she has a rare lightheaded spell, but it is rare.  I asked her about decreasing the diltiazem dose, and she was adamant about the fact she was happy with the diltiazem at 240 mg a day and but for the fact I picked up on her PSVT, she was asymptomatic.      REVIEW OF SYSTEMS:  Reviewed in Epic, and I endorse the findings but remarkably negative for a 10-point review.  She has a little bit of vague left hip pain.      PHYSICAL EXAMINATION:   GENERAL:  Well-developed, well-nourished, petite woman at 4 feet 11 and 120 pounds.    VITAL SIGNS:  As noted above.   NECK:  She had no neck vein distention or bruit at 30 degrees.   HEART:  Rapid, without gallop or murmur.   LUNGS:  Clear.   ABDOMEN:  Soft without organomegaly.   EXTREMITIES:  Free of edema.      On the diltiazem, she says she has a touch of constipation but no edema.  She has occasional fleeting lightheadedness, but it is rare.      IMPRESSION:   1.  Documented PSVT at 140 beats a minute but asymptomatic.      PLAN:  24-hour Holter monitor.  We will follow up with her after the monitor has been evaluated and read and discuss options as need be.  If this continues to be so well tolerated, we may be just able to let her live with it, but I would like to get a little better feel for how sustained and how frequent it is.      IMPRESSION:   1.  Documented relatively asymptomatic PSVT.  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   2.  Healthy adult otherwise.   3.  Diltiazem noted.  I thought it was wise to decrease the dose, but she did not want to and I did not fight with her about it.      PLAN:  As above.      cc:   Hetal Trujillo MD    [address]  [address]       Alyson Hector MD    [address]  [address]         MABLE YOUSIF MD, [facility]             D: 2018 10:42   T: 2018 11:53   MT: KEON      Name:     TAIWO HERNANDEZ   MRN:      [id number]        Account:      [id number]   :      1928           Service Date: 2018      Document: [id number]

## 2018-01-12 NOTE — PROGRESS NOTES
"Previously seen in clinic in 2010 after episode of \"narrow complex tachycardia\" which resolved with vagal manuevers and adenosine.  PMH: HTN, HLD, mitral insufficiency, SVT (on diltiazem).    1/12/17 Two year follow-up with Dr. Galaviz. 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 At last office visit, you wanted to decrease diltiazem due to bradycardia, but she preferred not to, so it was kept at one 240 mg 24 hour capsule daily.   She went to [facility] ER on 10/15/17 for black diarrhea x 2 days, guaiac positive. She had esophagitis and hemorrhoids but did not need a transfusion, and was discharged on 10/16. She was also seen at [facility] ER and observation 4/8-4/9 for constipation. She had an episode of SVT while there which converted with vagal maneuvers.    HPI and Plan:   See dictation  PSVT - asymptomatic.      I recommend continuing current treatment plans in the chart.          No orders of the defined types were placed in this encounter.    No orders of the defined types were placed in this encounter.    There are no discontinued medications.      No diagnosis found.    CURRENT MEDICATIONS:  Current Outpatient Prescriptions   Medication Sig Dispense Refill     diltiazem (CARDIZEM CD) 240 MG 24 hr capsule Take 1 capsule daily 15 capsule 0     hydrocortisone (ANUSOL-HC) 2.5 % cream Place 1 Application rectally 3 times daily as needed for itching       polyethylene glycol (MIRALAX) powder Take 17 g (1 capful) by mouth daily 510 g 1     simvastatin (ZOCOR) 10 MG tablet Take 10 mg by mouth At Bedtime         ALLERGIES     Allergies   Allergen Reactions     Sulfa Drugs Nausea       PAST MEDICAL HISTORY:  Past Medical History:   Diagnosis Date     Basal cell carcinoma 2014    on scalp     Cataract      GERD (gastroesophageal reflux disease)      HLD (hyperlipidemia)      Hypertension      Impaired fasting glucose 2005     Mitral valve insufficiency     SBE prophylaxis per PMD     Narrow complex tachycardia (H) 2010     Palpitations  " "      PAST SURGICAL HISTORY:  Past Surgical History:   Procedure Laterality Date     CARDIAC SURGERY       ESOPHAGOSCOPY, GASTROSCOPY, DUODENOSCOPY (EGD), COMBINED N/A 10/16/2016    Procedure: COMBINED ESOPHAGOSCOPY, GASTROSCOPY, DUODENOSCOPY (EGD), BIOPSY SINGLE OR MULTIPLE;  Surgeon: Kane Salazar MD;  Location:  GI       FAMILY HISTORY:  Family History   Problem Relation Age of Onset     DIABETES Mother      Unknown/Adopted Father        SOCIAL HISTORY:  Social History     Social History     Marital status:      Spouse name: N/A     Number of children: N/A     Years of education: N/A     Social History Main Topics     Smoking status: Former Smoker     Smokeless tobacco: Former User     Alcohol use Yes     Drug use: No     Sexual activity: Not Asked     Other Topics Concern     Caffeine Concern No     maybe some iced tea in the summer      Special Diet No     Exercise Yes     tap dance and walk every day      Social History Narrative         Review of Systems:  Skin:  Negative       Eyes:  Positive for glasses    ENT:  Negative      Respiratory:  Negative       Cardiovascular:    palpitations;Positive for;fatigue    Gastroenterology: Negative      Genitourinary:  Negative      Musculoskeletal:  Positive for joint pain L hip pain  Neurologic:  Negative      Psychiatric:  Negative      Heme/Lymph/Imm:  Negative      Endocrine:  Negative        Physical Exam:  Vitals: /64 (BP Location: Right arm, Patient Position: Sitting, Cuff Size: Adult Regular)  Pulse 60  Ht 1.499 m (4' 11\")  Wt 54.6 kg (120 lb 6.4 oz)  Breastfeeding? No  BMI 24.32 kg/m2    Constitutional:           Skin:             Head:           Eyes:           Lymph:      ENT:           Neck:           Respiratory:            Cardiac:                                                           GI:           Extremities and Muscular Skeletal:                 Neurological:           Psych:           Recent Lab Results:  LIPID " RESULTS:  No results found for: CHOL, HDL, LDL, TRIG, CHOLHDLRATIO    LIVER ENZYME RESULTS:  Lab Results   Component Value Date    AST 25 10/15/2016    ALT 29 10/15/2016       CBC RESULTS:  Lab Results   Component Value Date    WBC 12.8 (H) 04/08/2017    RBC 4.68 04/08/2017    HGB 14.1 04/08/2017    HCT 43.9 04/08/2017    MCV 94 04/08/2017    MCH 30.1 04/08/2017    MCHC 32.1 04/08/2017    RDW 13.4 04/08/2017     04/08/2017       BMP RESULTS:  Lab Results   Component Value Date     04/08/2017    POTASSIUM 3.7 04/08/2017    CHLORIDE 104 04/08/2017    CO2 29 04/08/2017    ANIONGAP 7 04/08/2017     (H) 04/08/2017    BUN 15 04/08/2017    CR 0.88 04/08/2017    GFRESTIMATED 60 (L) 04/08/2017    GFRESTBLACK 73 04/08/2017    IWONA 9.0 04/08/2017        A1C RESULTS:  No results found for: A1C    INR RESULTS:  Lab Results   Component Value Date    INR 0.98 10/15/2016    INR 0.92 06/04/2010           CC  Alyson Hector MD  [address] [address]

## 2018-01-12 NOTE — LETTER
1/12/2018      Hetal Trujillo  [facility] [address]  [facility] [address]      RE: Terese Nassar       Dear Colleague,    I had the pleasure of seeing Terese Nassar in the [facility].    HISTORY OF PRESENT ILLNESS:  I got together with Terese Nassar today.  She is a very bright and relatively active and vivacious 89-year-old female.  She is younger than her years physically and mentally.  She has had a history of documented supraventricular tachycardia.  The last I saw her was in 11/2015 -- 3 years ago.  She has not had significant heart disease otherwise.  In the past couple of years, she denies chest pain, palpitations, dizziness, syncope, shortness of breath, orthopnea or lower leg edema.  Quite honestly that sounded quite wonderful.  Her diltiazem was CD at 240 mg a day, which she feels quite comfortable with and has been delighted with how stable she is.  The only other significant medicine is simvastatin 10 mg at bedtime.      Just to show that nothing is ever as easy as you think, she came through the door at our clinic today with a heart rate of 60 and a blood pressure of 106/60.  Twenty minutes later when I came in for my interview and felt her pulse, she was tachycardic at a heart rate of 140.  An EKG confirmed a supraventricular tachycardia at 140 beats a minute with no significant EKG changes otherwise, and she was totally asymptomatic.  Although I was somewhat tempted to ignore the whole thing, I did order a 48-hour Holter monitor to get some clues about how frequently she is having this tachyarrhythmia, even though it is profoundly well tolerated.        She said she has a rare lightheaded spell, but it is rare.  I asked her about decreasing the diltiazem dose, and she was adamant about the fact she was happy with the diltiazem at 240 mg a day and but for the fact I picked up on her PSVT, she was asymptomatic.      REVIEW OF SYSTEMS:  Reviewed in  Epic, and I endorse the findings but remarkably negative for a 10-point review.  She has a little bit of vague left hip pain.      PHYSICAL EXAMINATION:   GENERAL:  Well-developed, well-nourished, petite woman at 4 feet 11 and 120 pounds.   VITAL SIGNS:  As noted above.   NECK:  She had no neck vein distention or bruit at 30 degrees.   HEART:  Rapid, without gallop or murmur.   LUNGS:  Clear.   ABDOMEN:  Soft without organomegaly.   EXTREMITIES:  Free of edema.      On the diltiazem, she says she has a touch of constipation but no edema.  She has occasional fleeting lightheadedness, but it is rare.      IMPRESSION:   1.  Documented PSVT at 140 beats a minute but asymptomatic.      PLAN:  24-hour Holter monitor.  We will follow up with her after the monitor has been evaluated and read and discuss options as need be.  If this continues to be so well tolerated, we may be just able to let her live with it, but I would like to get a little better feel for how sustained and how frequent it is.      IMPRESSION:   1.  Documented relatively asymptomatic PSVT.  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   2.  Healthy adult otherwise.   3.  Diltiazem noted.  I thought it was wise to decrease the dose, but she did not want to and I did not fight with her about it.      PLAN:  As above.     Outpatient Encounter Prescriptions as of 1/12/2018   Medication Sig Dispense Refill     diltiazem (CARDIZEM CD) 240 MG 24 hr capsule Take 1 capsule daily 90 capsule 3     hydrocortisone (ANUSOL-HC) 2.5 % cream Place 1 Application rectally 3 times daily as needed for itching       polyethylene glycol (MIRALAX) powder Take 17 g (1 capful) by mouth daily 510 g 1     simvastatin (ZOCOR) 10 MG tablet Take 10 mg by mouth At Bedtime       [DISCONTINUED] diltiazem (CARDIZEM CD) 240 MG 24 hr capsule Take 1 capsule daily 15 capsule 0     No facility-administered encounter medications on file as of 1/12/2018.      Again, thank you for allowing me to participate in the  care of your patient.      Sincerely,    MABLE YOUSIF MD     [facility]      cc:   Alyson Hector MD  [facility]  [address] [place] [address]

## 2018-02-15 ENCOUNTER — HOSPITAL ENCOUNTER (OUTPATIENT)
Dept: CARDIOLOGY | Facility: CLINIC | Age: 83
Discharge: HOME | End: 2018-02-15
Attending: INTERNAL MEDICINE | Admitting: INTERNAL MEDICINE
Payer: MEDICARE

## 2018-02-15 DIAGNOSIS — I10 ESSENTIAL HYPERTENSION: ICD-10-CM

## 2018-02-15 DIAGNOSIS — E78.2 MIXED HYPERLIPIDEMIA: ICD-10-CM

## 2018-02-15 DIAGNOSIS — I34.0 NON-RHEUMATIC MITRAL REGURGITATION: ICD-10-CM

## 2018-02-15 DIAGNOSIS — I47.10 PAROXYSMAL SUPRAVENTRICULAR TACHYCARDIA (H): ICD-10-CM

## 2018-02-15 PROCEDURE — 93225 XTRNL ECG REC<48 HRS REC: CPT

## 2018-02-15 PROCEDURE — 93227 XTRNL ECG REC<48 HR R&I: CPT | Performed by: INTERNAL MEDICINE

## 2018-02-26 ENCOUNTER — OFFICE VISIT (OUTPATIENT)
Dept: CARDIOLOGY | Facility: CLINIC | Age: 83
End: 2018-02-26
Attending: INTERNAL MEDICINE
Payer: COMMERCIAL

## 2018-02-26 VITALS
BODY MASS INDEX: 24.11 KG/M2 | HEIGHT: 59 IN | WEIGHT: 119.6 LBS | DIASTOLIC BLOOD PRESSURE: 64 MMHG | HEART RATE: 72 BPM | SYSTOLIC BLOOD PRESSURE: 130 MMHG

## 2018-02-26 DIAGNOSIS — E78.2 MIXED HYPERLIPIDEMIA: ICD-10-CM

## 2018-02-26 DIAGNOSIS — I34.0 NON-RHEUMATIC MITRAL REGURGITATION: ICD-10-CM

## 2018-02-26 DIAGNOSIS — I10 ESSENTIAL HYPERTENSION: ICD-10-CM

## 2018-02-26 DIAGNOSIS — I47.10 PAROXYSMAL SUPRAVENTRICULAR TACHYCARDIA (H): ICD-10-CM

## 2018-02-26 PROCEDURE — 99214 OFFICE O/P EST MOD 30 MIN: CPT | Performed by: NURSE PRACTITIONER

## 2018-02-26 RX ORDER — ACETAMINOPHEN 325 MG/1
325-650 TABLET ORAL EVERY 6 HOURS PRN
COMMUNITY

## 2018-02-26 NOTE — PROGRESS NOTES
HPI and Plan:  #[id number]  See dictation    Orders Placed This Encounter   Procedures     Comprehensive metabolic panel     TSH     Follow-Up with Cardiologist     Echocardiogram       Orders Placed This Encounter   Medications     acetaminophen (TYLENOL) 325 MG tablet     Sig: Take 325-650 mg by mouth every 6 hours as needed for mild pain       There are no discontinued medications.      Encounter Diagnoses   Name Primary?     Paroxysmal supraventricular tachycardia (H)      Non-rheumatic mitral regurgitation      Essential hypertension      Mixed hyperlipidemia        CURRENT MEDICATIONS:  Current Outpatient Prescriptions   Medication Sig Dispense Refill     acetaminophen (TYLENOL) 325 MG tablet Take 325-650 mg by mouth every 6 hours as needed for mild pain       diltiazem (CARDIZEM CD) 240 MG 24 hr capsule Take 1 capsule daily 90 capsule 3     hydrocortisone (ANUSOL-HC) 2.5 % cream Place 1 Application rectally 3 times daily as needed for itching       polyethylene glycol (MIRALAX) powder Take 17 g (1 capful) by mouth daily 510 g 1     simvastatin (ZOCOR) 10 MG tablet Take 10 mg by mouth At Bedtime         ALLERGIES     Allergies   Allergen Reactions     Sulfa Drugs Nausea       PAST MEDICAL HISTORY:  Past Medical History:   Diagnosis Date     Basal cell carcinoma 2014    on scalp     Cataract      GERD (gastroesophageal reflux disease)      HLD (hyperlipidemia)      Hypertension      Impaired fasting glucose 2005     Mitral valve insufficiency     SBE prophylaxis per PMD     Narrow complex tachycardia (H) 2010     Palpitations        PAST SURGICAL HISTORY:  Past Surgical History:   Procedure Laterality Date     CARDIAC SURGERY       ESOPHAGOSCOPY, GASTROSCOPY, DUODENOSCOPY (EGD), COMBINED N/A 10/16/2016    Procedure: COMBINED ESOPHAGOSCOPY, GASTROSCOPY, DUODENOSCOPY (EGD), BIOPSY SINGLE OR MULTIPLE;  Surgeon: Kane Salazar MD;  Location:  GI       FAMILY HISTORY:  Family History   Problem Relation Age of  "Onset     DIABETES Mother      Unknown/Adopted Father        SOCIAL HISTORY:  Social History     Social History     Marital status:      Spouse name: N/A     Number of children: N/A     Years of education: N/A     Social History Main Topics     Smoking status: Former Smoker     Smokeless tobacco: Former User     Alcohol use Yes     Drug use: No     Sexual activity: Not Asked     Other Topics Concern     Caffeine Concern No     maybe some iced tea in the summer      Special Diet No     Exercise Yes     tap dance and walk every day      Social History Narrative       Review of Systems:  Skin:  Positive for itching very dry    Eyes:  Positive for glasses    ENT:  Positive for sinus trouble;postnasal drainage    Respiratory:  Positive for cough productive cough from PND ,    Cardiovascular:    palpitations;Positive for;fatigue;lightheadedness lightheaded every once in a while   Gastroenterology: Positive for constipation Miralax helps , hx of bleeding ulcer -  none now   Genitourinary:  Negative      Musculoskeletal:  Positive for joint pain;arthritis L hip pain,  arthritis in hands   Neurologic:  Positive for numbness or tingling of hands arthritis in hands   Psychiatric:  Negative      Heme/Lymph/Imm:  Positive for allergies Sulfa drugs ,  occas fall allergies   Endocrine:  Negative        Physical Exam:  Vitals: /64 (BP Location: Right arm, Patient Position: Sitting, Cuff Size: Adult Regular)  Pulse 72  Ht 1.499 m (4' 11\")  Wt 54.3 kg (119 lb 9.6 oz)  BMI 24.16 kg/m2    Constitutional:  cooperative;in no acute distress appears younger than stated age;overweight      Skin:  warm and dry to the touch          Head:  normocephalic        Eyes:           Lymph:No Cervical lymphadenopathy present     ENT:  no pallor or cyanosis        Neck:  JVP normal        Respiratory:  clear to auscultation;normal respiratory excursion         Cardiac: regular rhythm;normal S1 and S2       systolic murmur              "                                    GI:  abdomen soft;BS normoactive        Extremities and Muscular Skeletal:  no deformities, clubbing, cyanosis, erythema observed;no edema              Neurological:  affect appropriate        Psych:  oriented to time, person and place;Alert and Oriented x 3          CC  Kane Galaviz MD  [address]  [place] [address]

## 2018-02-26 NOTE — LETTER
2/26/2018    Hetal Trujillo  [facility] [address] [facility] [address]    RE: Terese Nassar       Dear Colleague,    I had the pleasure of seeing Terese Nassar in the [facility].    HPI and Plan:  #[id number]  See dictation    Orders Placed This Encounter   Procedures     Comprehensive metabolic panel     TSH     Follow-Up with Cardiologist     Echocardiogram       Orders Placed This Encounter   Medications     acetaminophen (TYLENOL) 325 MG tablet     Sig: Take 325-650 mg by mouth every 6 hours as needed for mild pain       There are no discontinued medications.      Encounter Diagnoses   Name Primary?     Paroxysmal supraventricular tachycardia (H)      Non-rheumatic mitral regurgitation      Essential hypertension      Mixed hyperlipidemia        CURRENT MEDICATIONS:  Current Outpatient Prescriptions   Medication Sig Dispense Refill     acetaminophen (TYLENOL) 325 MG tablet Take 325-650 mg by mouth every 6 hours as needed for mild pain       diltiazem (CARDIZEM CD) 240 MG 24 hr capsule Take 1 capsule daily 90 capsule 3     hydrocortisone (ANUSOL-HC) 2.5 % cream Place 1 Application rectally 3 times daily as needed for itching       polyethylene glycol (MIRALAX) powder Take 17 g (1 capful) by mouth daily 510 g 1     simvastatin (ZOCOR) 10 MG tablet Take 10 mg by mouth At Bedtime         ALLERGIES     Allergies   Allergen Reactions     Sulfa Drugs Nausea       PAST MEDICAL HISTORY:  Past Medical History:   Diagnosis Date     Basal cell carcinoma 2014    on scalp     Cataract      GERD (gastroesophageal reflux disease)      HLD (hyperlipidemia)      Hypertension      Impaired fasting glucose 2005     Mitral valve insufficiency     SBE prophylaxis per PMD     Narrow complex tachycardia (H) 2010     Palpitations        PAST SURGICAL HISTORY:  Past Surgical History:   Procedure Laterality Date     CARDIAC SURGERY       ESOPHAGOSCOPY, GASTROSCOPY, DUODENOSCOPY (EGD),  "COMBINED N/A 10/16/2016    Procedure: COMBINED ESOPHAGOSCOPY, GASTROSCOPY, DUODENOSCOPY (EGD), BIOPSY SINGLE OR MULTIPLE;  Surgeon: Kane Salazar MD;  Location:  GI       FAMILY HISTORY:  Family History   Problem Relation Age of Onset     DIABETES Mother      Unknown/Adopted Father        SOCIAL HISTORY:  Social History     Social History     Marital status:      Spouse name: N/A     Number of children: N/A     Years of education: N/A     Social History Main Topics     Smoking status: Former Smoker     Smokeless tobacco: Former User     Alcohol use Yes     Drug use: No     Sexual activity: Not Asked     Other Topics Concern     Caffeine Concern No     maybe some iced tea in the summer      Special Diet No     Exercise Yes     tap dance and walk every day      Social History Narrative       Review of Systems:  Skin:  Positive for itching very dry    Eyes:  Positive for glasses    ENT:  Positive for sinus trouble;postnasal drainage    Respiratory:  Positive for cough productive cough from PND ,    Cardiovascular:    palpitations;Positive for;fatigue;lightheadedness lightheaded every once in a while   Gastroenterology: Positive for constipation Miralax helps , hx of bleeding ulcer -  none now   Genitourinary:  Negative      Musculoskeletal:  Positive for joint pain;arthritis L hip pain,  arthritis in hands   Neurologic:  Positive for numbness or tingling of hands arthritis in hands   Psychiatric:  Negative      Heme/Lymph/Imm:  Positive for allergies Sulfa drugs ,  occas fall allergies   Endocrine:  Negative        Physical Exam:  Vitals: /64 (BP Location: Right arm, Patient Position: Sitting, Cuff Size: Adult Regular)  Pulse 72  Ht 1.499 m (4' 11\")  Wt 54.3 kg (119 lb 9.6 oz)  BMI 24.16 kg/m2    Constitutional:  cooperative;in no acute distress appears younger than stated age;overweight      Skin:  warm and dry to the touch          Head:  normocephalic        Eyes:           Lymph:No Cervical " lymphadenopathy present     ENT:  no pallor or cyanosis        Neck:  JVP normal        Respiratory:  clear to auscultation;normal respiratory excursion         Cardiac: regular rhythm;normal S1 and S2       systolic murmur                                                 GI:  abdomen soft;BS normoactive        Extremities and Muscular Skeletal:  no deformities, clubbing, cyanosis, erythema observed;no edema              Neurological:  affect appropriate        Psych:  oriented to time, person and place;Alert and Oriented x 3          CC  Kane Galaviz MD  [address]  [place] [address]                    Thank you for allowing me to participate in the care of your patient.      Sincerely,     PAULA Hoffmann [facility]    cc:   Kane Galaviz MD  [address]  [place] [address]

## 2018-02-26 NOTE — LETTER
2/26/2018      Hetal Trujillo  [facility] [address] [facility] [address]      RE: Terese Nassar       Dear Colleague,    I had the pleasure of seeing Terese Nassar in the [facility].    Service Date: 02/26/2018      HISTORY OF PRESENT ILLNESS:  This 89-year-old female presents to [facility] today for a followup visit.  She is a patient of Dr. Galaviz's seen in our clinic for paroxysmal supraventricular tachycardia and hyperlipidemia.      Terese has a long history of tachycardia.  In 2010 she had a hospital admission for palpitations and narrow complex tachycardia, which returned to sinus rhythm after vagal maneuvers and adenosine.  Over the years she has been treated with diltiazem.  Echocardiogram done in 2001 demonstrated normal left ventricular function, left atrial enlargement and moderate mitral insufficiency.  She has done well over the years and saw Dr. Galaviz for annual followup last month.  In the office he appreciated some tachycardia, and electrocardiogram did demonstrate paroxysmal supraventricular tachycardia.  Therefore, she was placed on a 48-hour Holter monitor.  She returns today for reassessment and review of her test results.      Terese comes in with her family member today.  Overall, she does well.  She does all her household chores and is very active without any limitations.  Occasionally, she will feel some palpitations, but these are brief.  She denies any associated lightheadedness, dizziness or near syncope.  Terese has no orthopnea, paroxysmal nocturnal dyspnea or peripheral edema.  She has no chest pain with activity or at rest or any shortness of breath.      I reviewed her Holter monitor.  This showed a principal rhythm of sinus rhythm with intermittent first-degree AV block.  Average heart rate is 75 beats per minute.  She was noted to have 48,000 premature ventricular contractions, placing her PVC  burden at 22%.  There were no significant pauses or bradycardia.  She was noted to have 100 runs of premature ventricular contractions, the longest being 12 beats at 162 beats per minute.  Some of these premature ventricular contractions were polymorphic.  In addition, she was noted to have 21,000 premature atrial contractions.  208 runs, the longest being 1700 beats at 114 beats per minute.  As mentioned, Taiwo was fairly unaware of these arrhythmias.      PHYSICAL EXAMINATION:  Her blood pressure today is 130/64, heart rate is 72 beats per minute and is regular.  Her lungs are clear.  There is no peripheral edema.      IMPRESSION AND PLAN:   1.  Paroxysmal supraventricular tachycardia with multiple premature atrial contractions and premature ventricular contractions.  Fairly asymptomatic with these.  She does have occasional brief palpitations without associated symptoms.  Currently, she is on diltiazem.  I will not make any changes.  Her Holter monitor did reveal a PVC burden of 22% with some polymorphic premature ventricular contractions and multiple supraventricular tachycardia runs up to 114 beats per minute.  At this time, I have asked her to undergo an echocardiogram and will check a CMP and TSH level.  We will have her return for followup with Dr. Galaviz for the plan going forward.   2.  Treated hyperlipidemia.  This is being managed through her primary medical doctor.   3.  Hypertension.  Blood pressure controlled.   4.  Previous history of mitral regurgitation.      Thank you for allowing me to participate in this patient's care.         PAULA SCOTT, CNP             D: 2018   T: 2018   MT: KEON      Name:     TAIWO HERNANDEZ   MRN:      [id number]        Account:      [id number]   :      [id number]           Service Date: 2018      Document: [id number]           Outpatient Encounter Prescriptions as of 2018   Medication Sig Dispense Refill     acetaminophen (TYLENOL) 325 MG  tablet Take 325-650 mg by mouth every 6 hours as needed for mild pain       diltiazem (CARDIZEM CD) 240 MG 24 hr capsule Take 1 capsule daily 90 capsule 3     hydrocortisone (ANUSOL-HC) 2.5 % cream Place 1 Application rectally 3 times daily as needed for itching       polyethylene glycol (MIRALAX) powder Take 17 g (1 capful) by mouth daily 510 g 1     simvastatin (ZOCOR) 10 MG tablet Take 10 mg by mouth At Bedtime       No facility-administered encounter medications on file as of 2/26/2018.        Again, thank you for allowing me to participate in the care of your patient.      Sincerely,    PAULA Hoffmann [facility]

## 2018-02-26 NOTE — MR AVS SNAPSHOT
After Visit Summary   2/26/2018    Terese aNssar    MRN: [id number]           Patient Information     Date Of Birth          8/7/1928        Visit Information        Provider Department      2/26/2018 3:10 PM Terese Pineda APRN CNP [facility]        Today's Diagnoses     Paroxysmal supraventricular tachycardia (H)        Non-rheumatic mitral regurgitation        Essential hypertension        Mixed hyperlipidemia           Follow-ups after your visit        Additional Services     Follow-Up with Cardiologist                 Your next 10 appointments already scheduled     Mar 28, 2018  9:45 AM CDT   Ech Complete with [address] (Formerly named [facility])    [address]  [facility] [id number] [phone]           1. Please bring or wear a comfortable two-piece outfit. 2. You may eat, drink and take your normal medicines. 3. For any questions that cannot be answered, please contact the ordering physician ***Please check-in at the [facility] [place] Registration Office located in [address] in the [facility] building. When you are finished registering, please go to [address] and have a seat. The technician will call your name for the test.            Mar 28, 2018 10:45 AM CDT   LAB with RU LAB   [facility]    [address]  [facility] [id number] [phone]           Please do not eat 10-12 hours before your appointment if you are coming in fasting for labs on lipids, cholesterol, or glucose (sugar). This does not apply to pregnant women. Water, hot tea and black coffee (with nothing added) are okay. Do not drink other fluids, diet soda or chew gum.            Apr 12, 2018 10:15 AM CDT   Return Visit with Kane Galaviz MD   [facility]    [address]  [address]  [facility] [id number] [phone]              Future tests that were ordered for you today     Open Future Orders        Priority Expected Expires Ordered    Follow-Up with Cardiologist Routine 2018    Comprehensive metabolic panel Routine 3/5/2018 2019 2018    TSH Routine 3/5/2018 2019 2018    Echocardiogram Routine 3/5/2018 2019 2018            Who to contact     If you have questions or need follow up information about today's clinic visit or your schedule please contact [facility] directly at [phone].  Normal or non-critical lab and imaging results will be communicated to you by Exaptivehart, letter or phone within 4 business days after the clinic has received the results. If you do not hear from us within 7 days, please contact the clinic through Exaptivehart or phone. If you have a critical or abnormal lab result, we will notify you by phone as soon as possible.  Submit refill requests through Azuro or call your pharmacy and they will forward the refill request to us. Please allow 3 business days for your refill to be completed.          Additional Information About Your Visit        ExaptiveharPrediculous Information     Azuro lets you send messages to your doctor, view your test results, renew your prescriptions, schedule appointments and more. To sign up, go to [email or web address] . Click on \"Log in\" on the left side of the screen, which will take you to the Welcome page. Then click on \"Sign up Now\" on the right side of the page.     You will be asked to enter the access code listed below, as well as some personal information. Please follow the directions to create your username and password.     Your access code is: [email or web address]  Expires: 2018 10:42 AM     Your access code will  in 90 days. If you need help or a new code, please call your [place] clinic or [phone].      " "  Care EveryWhere ID     This is your Care EveryWhere ID. This could be used by other organizations to access your [place] medical records  [id number]        Your Vitals Were     Pulse Height BMI (Body Mass Index)             72 1.499 m (4' 11\") 24.16 kg/m2          Blood Pressure from Last 3 Encounters:   02/26/18 130/64   01/12/18 106/64   04/09/17 141/65    Weight from Last 3 Encounters:   02/26/18 54.3 kg (119 lb 9.6 oz)   01/12/18 54.6 kg (120 lb 6.4 oz)   04/09/17 54.9 kg (121 lb)              We Performed the Following     Follow-Up with Cardiac Advanced Practice Provider        Primary Care Provider Office Phone # Fax #    Hetal Trujillo [phone]       [facility] [address] [address] [address]        Equal Access to Services     [facility] : Hadii aad ku hadasho Soleo, waaxda luqadaha, qaybta kaalmada adeegyada, waxay sullyin hayhammadn kathryn wahl . So [facility] [phone].    ATENCIÓN: Si habla español, tiene a zaragoza disposición servicios gratuitos de asistencia lingüística. Coy al [phone].    We comply with applicable federal civil rights laws and [place] laws. We do not discriminate on the basis of race, color, national origin, age, disability, sex, sexual orientation, or gender identity.            Thank you!     Thank you for choosing [facility]  for your care. Our goal is always to provide you with excellent care. Hearing back from our patients is one way we can continue to improve our services. Please take a few minutes to complete the written survey that you may receive in the mail after your visit with us. Thank you!             Your Updated Medication List - Protect others around you: Learn how to safely use, store and throw away your medicines at www.disposemymeds.org.          This list is accurate as of 2/26/18  3:37 PM.  Always use your most recent med list.                   Brand Name Dispense " Instructions for use Diagnosis    acetaminophen 325 MG tablet    TYLENOL     Take 325-650 mg by mouth every 6 hours as needed for mild pain        diltiazem 240 MG 24 hr capsule    CARDIZEM CD    90 capsule    Take 1 capsule daily    Narrow complex tachycardia (H)       hydrocortisone 2.5 % cream    ANUSOL-HC     Place 1 Application rectally 3 times daily as needed for itching        MIRALAX powder   Generic drug:  polyethylene glycol     510 g    Take 17 g (1 capful) by mouth daily    Constipation       simvastatin 10 MG tablet    ZOCOR     Take 10 mg by mouth At Bedtime

## 2018-02-27 NOTE — PROGRESS NOTES
Service Date: 02/26/2018      HISTORY OF PRESENT ILLNESS:  This 89-year-old female presents to [facility] today for a followup visit.  She is a patient of Dr. Galaviz's seen in our clinic for paroxysmal supraventricular tachycardia and hyperlipidemia.      Terese has a long history of tachycardia.  In 2010 she had a hospital admission for palpitations and narrow complex tachycardia, which returned to sinus rhythm after vagal maneuvers and adenosine.  Over the years she has been treated with diltiazem.  Echocardiogram done in 2001 demonstrated normal left ventricular function, left atrial enlargement and moderate mitral insufficiency.  She has done well over the years and saw Dr. Galaviz for annual followup last month.  In the office he appreciated some tachycardia, and electrocardiogram did demonstrate paroxysmal supraventricular tachycardia.  Therefore, she was placed on a 48-hour Holter monitor.  She returns today for reassessment and review of her test results.      Terese comes in with her family member today.  Overall, she does well.  She does all her household chores and is very active without any limitations.  Occasionally, she will feel some palpitations, but these are brief.  She denies any associated lightheadedness, dizziness or near syncope.  Terese has no orthopnea, paroxysmal nocturnal dyspnea or peripheral edema.  She has no chest pain with activity or at rest or any shortness of breath.      I reviewed her Holter monitor.  This showed a principal rhythm of sinus rhythm with intermittent first-degree AV block.  Average heart rate is 75 beats per minute.  She was noted to have 48,000 premature ventricular contractions, placing her PVC burden at 22%.  There were no significant pauses or bradycardia.  She was noted to have 100 runs of premature ventricular contractions, the longest being 12 beats at 162 beats per minute.  Some of these premature ventricular contractions were  polymorphic.  In addition, she was noted to have 21,000 premature atrial contractions.  208 runs, the longest being 1700 beats at 114 beats per minute.  As mentioned, Taiwo was fairly unaware of these arrhythmias.      PHYSICAL EXAMINATION:  Her blood pressure today is 130/64, heart rate is 72 beats per minute and is regular.  Her lungs are clear.  There is no peripheral edema.      IMPRESSION AND PLAN:   1.  Paroxysmal supraventricular tachycardia with multiple premature atrial contractions and premature ventricular contractions.  Fairly asymptomatic with these.  She does have occasional brief palpitations without associated symptoms.  Currently, she is on diltiazem.  I will not make any changes.  Her Holter monitor did reveal a PVC burden of 22% with some polymorphic premature ventricular contractions and multiple supraventricular tachycardia runs up to 114 beats per minute.  At this time, I have asked her to undergo an echocardiogram and will check a CMP and TSH level.  We will have her return for followup with Dr. Galaviz for the plan going forward.   2.  Treated hyperlipidemia.  This is being managed through her primary medical doctor.   3.  Hypertension.  Blood pressure controlled.   4.  Previous history of mitral regurgitation.      Thank you for allowing me to participate in this patient's care.         TAIWO TALAVERAPAULA KELLY, CNP             D: 2018   T: 2018   MT: KEON      Name:     TAIWO HERNANDEZ   MRN:      [id number]        Account:      [id number]   :      1928           Service Date: 2018      Document: [id number]

## 2018-03-28 ENCOUNTER — HOSPITAL ENCOUNTER (OUTPATIENT)
Dept: CARDIOLOGY | Facility: CLINIC | Age: 83
Discharge: HOME | End: 2018-03-28
Attending: NURSE PRACTITIONER | Admitting: NURSE PRACTITIONER
Payer: MEDICARE

## 2018-03-28 DIAGNOSIS — I47.10 PAROXYSMAL SUPRAVENTRICULAR TACHYCARDIA (H): ICD-10-CM

## 2018-03-28 LAB
ALBUMIN SERPL-MCNC: 3.3 G/DL (ref 3.4–5)
ALP SERPL-CCNC: 54 U/L (ref 40–150)
ALT SERPL W P-5'-P-CCNC: 28 U/L (ref 0–50)
ANION GAP SERPL CALCULATED.3IONS-SCNC: 7 MMOL/L (ref 3–14)
AST SERPL W P-5'-P-CCNC: 22 U/L (ref 0–45)
BILIRUB SERPL-MCNC: 1 MG/DL (ref 0.2–1.3)
BUN SERPL-MCNC: 17 MG/DL (ref 7–30)
CALCIUM SERPL-MCNC: 8.7 MG/DL (ref 8.5–10.1)
CHLORIDE SERPL-SCNC: 109 MMOL/L (ref 94–109)
CO2 SERPL-SCNC: 27 MMOL/L (ref 20–32)
CREAT SERPL-MCNC: 0.8 MG/DL (ref 0.52–1.04)
GFR SERPL CREATININE-BSD FRML MDRD: 67 ML/MIN/1.7M2
GLUCOSE SERPL-MCNC: 109 MG/DL (ref 70–99)
POTASSIUM SERPL-SCNC: 3.8 MMOL/L (ref 3.4–5.3)
PROT SERPL-MCNC: 6.6 G/DL (ref 6.8–8.8)
SODIUM SERPL-SCNC: 143 MMOL/L (ref 133–144)
TSH SERPL DL<=0.005 MIU/L-ACNC: 1.3 MU/L (ref 0.4–4)

## 2018-03-28 PROCEDURE — 25500064 ZZH RX 255 OP 636: Performed by: NURSE PRACTITIONER

## 2018-03-28 PROCEDURE — 40000264 ECHO COMPLETE WITH OPTISON

## 2018-03-28 PROCEDURE — 80053 COMPREHEN METABOLIC PANEL: CPT | Performed by: NURSE PRACTITIONER

## 2018-03-28 PROCEDURE — 93306 TTE W/DOPPLER COMPLETE: CPT | Mod: 26 | Performed by: INTERNAL MEDICINE

## 2018-03-28 PROCEDURE — 84443 ASSAY THYROID STIM HORMONE: CPT | Performed by: NURSE PRACTITIONER

## 2018-03-28 RX ADMIN — HUMAN ALBUMIN MICROSPHERES AND PERFLUTREN 3 ML: 10; .22 INJECTION, SOLUTION INTRAVENOUS at 10:45

## 2018-03-30 ENCOUNTER — TELEPHONE (OUTPATIENT)
Dept: CARDIOLOGY | Facility: CLINIC | Age: 83
End: 2018-03-30

## 2018-03-30 NOTE — TELEPHONE ENCOUNTER
Echo  Date performed: 03-28-18      The patient exhibited frequent PVCs.  Left ventricular systolic function is normal.  The visual ejection fraction is estimated at 55-60%.  There is moderate concentric left ventricular hypertrophy.  The right ventricle is normal in structure, function and size.  The left atrium is moderately dilated.  Doppler interrogation does not demonstrate significant stenosis or  insufficiency involving cardiac valves.     No old stiudies for comparison  -----------------------------------------------------------------------    Component      Latest Ref Rng & Units 3/28/2018   Sodium      133 - 144 mmol/L 143   Potassium      3.4 - 5.3 mmol/L 3.8   Chloride      94 - 109 mmol/L 109   Carbon Dioxide      20 - 32 mmol/L 27   Anion Gap      3 - 14 mmol/L 7   Glucose      70 - 99 mg/dL 109 (H)   Urea Nitrogen      7 - 30 mg/dL 17   Creatinine      0.52 - 1.04 mg/dL 0.80   GFR Estimate      >60 mL/min/1.7m2 67   GFR Estimate If Black      >60 mL/min/1.7m2 81   Calcium      8.5 - 10.1 mg/dL 8.7   Bilirubin Total      0.2 - 1.3 mg/dL 1.0   Albumin      3.4 - 5.0 g/dL 3.3 (L)   Protein Total      6.8 - 8.8 g/dL 6.6 (L)   Alkaline Phosphatase      40 - 150 U/L 54   ALT      0 - 50 U/L 28   AST      0 - 45 U/L 22   TSH      0.40 - 4.00 mU/L 1.30   ----------------------------------------------------------------------    Results were reviewed with patient over the phone. Patient was advised to continue her current medication regimen, increase protein in her diet, and to f/u with Dr. Galaviz on 04-12-18 as planned. Patient had no questions.     Ansley BLAKE  [facility]

## 2018-04-09 ENCOUNTER — PRE VISIT (OUTPATIENT)
Dept: CARDIOLOGY | Facility: CLINIC | Age: 83
End: 2018-04-09

## 2018-04-12 ENCOUNTER — OFFICE VISIT (OUTPATIENT)
Dept: CARDIOLOGY | Facility: CLINIC | Age: 83
End: 2018-04-12
Attending: NURSE PRACTITIONER
Payer: COMMERCIAL

## 2018-04-12 VITALS
HEART RATE: 64 BPM | HEIGHT: 59 IN | BODY MASS INDEX: 24.31 KG/M2 | DIASTOLIC BLOOD PRESSURE: 60 MMHG | WEIGHT: 120.6 LBS | SYSTOLIC BLOOD PRESSURE: 128 MMHG

## 2018-04-12 DIAGNOSIS — I47.10 PAROXYSMAL SUPRAVENTRICULAR TACHYCARDIA (H): ICD-10-CM

## 2018-04-12 PROCEDURE — 99214 OFFICE O/P EST MOD 30 MIN: CPT | Performed by: INTERNAL MEDICINE

## 2018-04-12 NOTE — MR AVS SNAPSHOT
"              After Visit Summary   2018    Terese Nassar    MRN: [id number]           Patient Information     Date Of Birth          1928        Visit Information        Provider Department      2018 10:15 AM Kane Galaviz MD [facility]        Today's Diagnoses     Paroxysmal supraventricular tachycardia (H)           Follow-ups after your visit        Who to contact     If you have questions or need follow up information about today's clinic visit or your schedule please contact [facility] directly at [phone].  Normal or non-critical lab and imaging results will be communicated to you by Extend Labshart, letter or phone within 4 business days after the clinic has received the results. If you do not hear from us within 7 days, please contact the clinic through Blue [employer] Network[employer] or phone. If you have a critical or abnormal lab result, we will notify you by phone as soon as possible.  Submit refill requests through [employer] or call your pharmacy and they will forward the refill request to us. Please allow 3 business days for your refill to be completed.          Additional Information About Your Visit        MyChart Information     [employer] lets you send messages to your doctor, view your test results, renew your prescriptions, schedule appointments and more. To sign up, go to [email or web address] . Click on \"Log in\" on the left side of the screen, which will take you to the Welcome page. Then click on \"Sign up Now\" on the right side of the page.     You will be asked to enter the access code listed below, as well as some personal information. Please follow the directions to create your username and password.     Your access code is: [email or web address]  Expires: 2018  1:05 PM     Your access code will  in 90 days. If you need help or a new code, please call your [place] clinic or [phone].        Care EveryWhere ID " "    This is your Care EveryWhere ID. This could be used by other organizations to access your [place] medical records  [id number]        Your Vitals Were     Pulse Height Breastfeeding? BMI (Body Mass Index)          64 1.499 m (4' 11\") No 24.36 kg/m2         Blood Pressure from Last 3 Encounters:   04/12/18 128/60   02/26/18 130/64   01/12/18 106/64    Weight from Last 3 Encounters:   04/12/18 54.7 kg (120 lb 9.6 oz)   02/26/18 54.3 kg (119 lb 9.6 oz)   01/12/18 54.6 kg (120 lb 6.4 oz)              We Performed the Following     Follow-Up with Cardiologist        Primary Care Provider Office Phone # Fax #    Hetal Trujillo [phone]       [facility] [address] [facility] [address]        Equal Access to Services     FREDA SMITH : Hadii aad ku hadasho Soomaali, waaxda luqadaha, qaybta kaalmada adeegyada, waxay sullyin hayhammadn kathryn wahl . So [facility] [phone].    ATENCIÓN: Si habla español, tiene a zaragoza disposición servicios gratuitos de asistencia lingüística. Coy al [phone].    We comply with applicable federal civil rights laws and [place] laws. We do not discriminate on the basis of race, color, national origin, age, disability, sex, sexual orientation, or gender identity.            Thank you!     Thank you for choosing [facility]  for your care. Our goal is always to provide you with excellent care. Hearing back from our patients is one way we can continue to improve our services. Please take a few minutes to complete the written survey that you may receive in the mail after your visit with us. Thank you!             Your Updated Medication List - Protect others around you: Learn how to safely use, store and throw away your medicines at www.disposemymeds.org.          This list is accurate as of 4/12/18  1:05 PM.  Always use your most recent med list.                   Brand Name Dispense Instructions for use " Diagnosis    acetaminophen 325 MG tablet    TYLENOL     Take 325-650 mg by mouth every 6 hours as needed for mild pain        diltiazem 240 MG 24 hr capsule    CARDIZEM CD    90 capsule    Take 1 capsule daily    Narrow complex tachycardia (H)       hydrocortisone 2.5 % cream    ANUSOL-HC     Place 1 Application rectally 3 times daily as needed for itching        MIRALAX powder   Generic drug:  polyethylene glycol     510 g    Take 17 g (1 capful) by mouth daily    Constipation       simvastatin 10 MG tablet    ZOCOR     Take 10 mg by mouth At Bedtime

## 2018-04-12 NOTE — LETTER
4/12/2018      Hetal Trujillo  [facility] [address] [address] [facility] [address]      RE: Terese Nassar       Dear Colleague,    I had the pleasure of seeing Terese Nassar in the [facility].    Service Date: 04/12/2018      HISTORY OF PRESENT ILLNESS:  I saw Terese Nassar today, who is 89.  She is a very bright and vivacious woman who as usual comes with her daughter.  We have seen her because of a history of paroxysmal supraventricular tachycardia and a tendency to frequent PVCs.  All of this remarkably is quite asymptomatic.  She cannot feel her heart beating irregularly and does not have dizziness or syncope.      Recent laboratory work a month ago showed potassium 3.8, creatinine 0.8.  Serum albumin was a hair low at 3.3.  TSH 1.3.  An echocardiogram done at the end of last month showed normal LV and RV systolic function.  Left atrium was moderately dilated.  There was no mitral insufficiency.  Aortic valve was relatively normal.  Rights heart pressures were normal at 24 mmHg plus right atrial pressure.  Quite a good echo for being 89.      She has been on diltiazem 240 mg a day and tolerated this with no obvious side effects.      Accordingly, she has PVCs.  They are relatively asymptomatic.  She has a relatively normal heart by echo, and accordingly, I do not feel further assessment or treatment is needed with regards to her PVCs.  She is having nothing to suggest frequent or symptomatic PSVTs.  There were 50,000 PVCs.  There was one 3-beat run at 170 beats a minute and significant episodes of bigeminy and trigeminy.  At age 89 and absolutely no symptoms, I have left her on the diltiazem.  Should she become more symptomatic, a consideration of low-dose beta blocker might be entertained, but as it is, she says she feels fine and has no symptoms.      PHYSICAL EXAMINATION:  Her physical exam is the same as during her last visit.  Rhythm is regular, with a rare skipped  beat.  Blood pressure 120/60, heart rate 64 beats a minute.  She weighs 120 pounds at 4 feet 11.      IMPRESSION:    1.  Rather frequent but well-tolerated asymptomatic PVCs.   2.  No signs of significant heart disease otherwise.   3.  No signs of significant electrolyte abnormality.  It would minimize or try to eliminate any risk of hypokalemia.      She looks well and she feels well.  We agreed to get together should she become symptomatic.  If you have questions or concerns or disagreements, give me a call.      IMPRESSION:   1.  Relatively normal heart.   2.  Relatively quiet paroxysmal supraventricular tachycardia on diltiazem.   3.  Frequent PVCs but not enough to be symptomatic.  Low-dose beta blocker might be trialed in the future should they become more marked.      Over 35 minutes was spent doing the consult, reviewing old records, imaging, monitor, EKG, lab work, blood work, echo, etc.      cc:   Hetal Trujillo MD    [facility]    [id number] [place] [address]  [id number]         MABLE YOUSIF MD, [facility]             D: 2018   T: 2018   MT: KEON      Name:     TAIWO HERNANDEZ   MRN:      [id number]        Account:      [id number]   :      1928           Service Date: 2018      Document: [id number]         Outpatient Encounter Prescriptions as of 2018   Medication Sig Dispense Refill     acetaminophen (TYLENOL) 325 MG tablet Take 325-650 mg by mouth every 6 hours as needed for mild pain       diltiazem (CARDIZEM CD) 240 MG 24 hr capsule Take 1 capsule daily 90 capsule 3     hydrocortisone (ANUSOL-HC) 2.5 % cream Place 1 Application rectally 3 times daily as needed for itching       polyethylene glycol (MIRALAX) powder Take 17 g (1 capful) by mouth daily 510 g 1     simvastatin (ZOCOR) 10 MG tablet Take 10 mg by mouth At Bedtime       No facility-administered encounter medications on file as of 2018.        Again, thank you for allowing me to participate  in the care of your patient.      Sincerely,    MABLE YOUSIF MD     [facility]

## 2018-04-12 NOTE — LETTER
4/12/2018    Hetal Trujillo  [facility] [address] [facility] [address]    RE: Terese Nassar       Dear Colleague,    I had the pleasure of seeing Terese Nassar in the [facility].    1.  Paroxysmal supraventricular tachycardia with multiple premature atrial contractions and premature ventricular contractions.  Fairly asymptomatic with these.  She does have occasional brief palpitations without associated symptoms.  Currently, she is on diltiazem.  I will not make any changes.  Her Holter monitor did reveal a PVC burden of 22% with some polymorphic premature ventricular contractions and multiple supraventricular tachycardia runs up to 114 beats per minute.  At this time, I have asked her to undergo an echocardiogram and will check a CMP and TSH level.     HPI and Plan:   See dictation            No orders of the defined types were placed in this encounter.    No orders of the defined types were placed in this encounter.    There are no discontinued medications.      Encounter Diagnosis   Name Primary?     Paroxysmal supraventricular tachycardia (H)        CURRENT MEDICATIONS:  Current Outpatient Prescriptions   Medication Sig Dispense Refill     acetaminophen (TYLENOL) 325 MG tablet Take 325-650 mg by mouth every 6 hours as needed for mild pain       diltiazem (CARDIZEM CD) 240 MG 24 hr capsule Take 1 capsule daily 90 capsule 3     hydrocortisone (ANUSOL-HC) 2.5 % cream Place 1 Application rectally 3 times daily as needed for itching       polyethylene glycol (MIRALAX) powder Take 17 g (1 capful) by mouth daily 510 g 1     simvastatin (ZOCOR) 10 MG tablet Take 10 mg by mouth At Bedtime         ALLERGIES     Allergies   Allergen Reactions     Sulfa Drugs Nausea       PAST MEDICAL HISTORY:  Past Medical History:   Diagnosis Date     Basal cell carcinoma 2014    on scalp     Cataract      GERD (gastroesophageal reflux disease)      HLD (hyperlipidemia)       "Hypertension      Impaired fasting glucose 2005     Mitral valve insufficiency     SBE prophylaxis per PMD     Narrow complex tachycardia (H) 2010     Palpitations        PAST SURGICAL HISTORY:  Past Surgical History:   Procedure Laterality Date     CARDIAC SURGERY       ESOPHAGOSCOPY, GASTROSCOPY, DUODENOSCOPY (EGD), COMBINED N/A 10/16/2016    Procedure: COMBINED ESOPHAGOSCOPY, GASTROSCOPY, DUODENOSCOPY (EGD), BIOPSY SINGLE OR MULTIPLE;  Surgeon: Kane Salazar MD;  Location:  GI       FAMILY HISTORY:  Family History   Problem Relation Age of Onset     DIABETES Mother      Unknown/Adopted Father        SOCIAL HISTORY:  Social History     Social History     Marital status:      Spouse name: N/A     Number of children: N/A     Years of education: N/A     Social History Main Topics     Smoking status: Former Smoker     Smokeless tobacco: Former User     Alcohol use Yes     Drug use: No     Sexual activity: No     Other Topics Concern     Caffeine Concern No     maybe some iced tea in the summer      Special Diet No     Exercise Yes     tap dance and walk every day      Social History Narrative         Review of Systems:  Skin:  Negative       Eyes:  Positive for glasses    ENT:  Negative      Respiratory:  Negative       Cardiovascular:    palpitations;Positive for;fatigue    Gastroenterology: Negative      Genitourinary:  Negative      Musculoskeletal:  Positive for joint pain;arthritis;back pain    Neurologic:  Negative      Psychiatric:  Negative      Heme/Lymph/Imm:  Negative      Endocrine:  Negative        Physical Exam:  Vitals: /60 (BP Location: Right arm, Patient Position: Sitting, Cuff Size: Adult Regular)  Pulse 64  Ht 1.499 m (4' 11\")  Wt 54.7 kg (120 lb 9.6 oz)  Breastfeeding? No  BMI 24.36 kg/m2    Constitutional:           Skin:             Head:           Eyes:           Lymph:      ENT:           Neck:           Respiratory:            Cardiac:                                   "                         GI:           Extremities and Muscular Skeletal:                 Neurological:           Psych:           Recent Lab Results:  LIPID RESULTS:  No results found for: CHOL, HDL, LDL, TRIG, CHOLHDLRATIO    LIVER ENZYME RESULTS:  Lab Results   Component Value Date    AST 22 03/28/2018    ALT 28 03/28/2018       CBC RESULTS:  Lab Results   Component Value Date    WBC 12.8 (H) 04/08/2017    RBC 4.68 04/08/2017    HGB 14.1 04/08/2017    HCT 43.9 04/08/2017    MCV 94 04/08/2017    MCH 30.1 04/08/2017    MCHC 32.1 04/08/2017    RDW 13.4 04/08/2017     04/08/2017       BMP RESULTS:  Lab Results   Component Value Date     03/28/2018    POTASSIUM 3.8 03/28/2018    CHLORIDE 109 03/28/2018    CO2 27 03/28/2018    ANIONGAP 7 03/28/2018     (H) 03/28/2018    BUN 17 03/28/2018    CR 0.80 03/28/2018    GFRESTIMATED 67 03/28/2018    GFRESTBLACK 81 03/28/2018    IWONA 8.7 03/28/2018        A1C RESULTS:  No results found for: A1C    INR RESULTS:  Lab Results   Component Value Date    INR 0.98 10/15/2016    INR 0.92 06/04/2010           CC  PAULA Ordoñez CNP  [address]  [place] [address]                      Thank you for allowing me to participate in the care of your patient.      Sincerely,     MABLE YOUSIF MD     [facility]    cc:   PAULA Ordoñez CNP  [address]  [place] [address]

## 2018-04-12 NOTE — PROGRESS NOTES
1.  Paroxysmal supraventricular tachycardia with multiple premature atrial contractions and premature ventricular contractions.  Fairly asymptomatic with these.  She does have occasional brief palpitations without associated symptoms.  Currently, she is on diltiazem.  I will not make any changes.  Her Holter monitor did reveal a PVC burden of 22% with some polymorphic premature ventricular contractions and multiple supraventricular tachycardia runs up to 114 beats per minute.  At this time, I have asked her to undergo an echocardiogram and will check a CMP and TSH level.     HPI and Plan:   See dictation            No orders of the defined types were placed in this encounter.    No orders of the defined types were placed in this encounter.    There are no discontinued medications.      Encounter Diagnosis   Name Primary?     Paroxysmal supraventricular tachycardia (H)        CURRENT MEDICATIONS:  Current Outpatient Prescriptions   Medication Sig Dispense Refill     acetaminophen (TYLENOL) 325 MG tablet Take 325-650 mg by mouth every 6 hours as needed for mild pain       diltiazem (CARDIZEM CD) 240 MG 24 hr capsule Take 1 capsule daily 90 capsule 3     hydrocortisone (ANUSOL-HC) 2.5 % cream Place 1 Application rectally 3 times daily as needed for itching       polyethylene glycol (MIRALAX) powder Take 17 g (1 capful) by mouth daily 510 g 1     simvastatin (ZOCOR) 10 MG tablet Take 10 mg by mouth At Bedtime         ALLERGIES     Allergies   Allergen Reactions     Sulfa Drugs Nausea       PAST MEDICAL HISTORY:  Past Medical History:   Diagnosis Date     Basal cell carcinoma 2014    on scalp     Cataract      GERD (gastroesophageal reflux disease)      HLD (hyperlipidemia)      Hypertension      Impaired fasting glucose 2005     Mitral valve insufficiency     SBE prophylaxis per PMD     Narrow complex tachycardia (H) 2010     Palpitations        PAST SURGICAL HISTORY:  Past Surgical History:   Procedure Laterality Date  "    CARDIAC SURGERY       ESOPHAGOSCOPY, GASTROSCOPY, DUODENOSCOPY (EGD), COMBINED N/A 10/16/2016    Procedure: COMBINED ESOPHAGOSCOPY, GASTROSCOPY, DUODENOSCOPY (EGD), BIOPSY SINGLE OR MULTIPLE;  Surgeon: Kane Salazar MD;  Location:  GI       FAMILY HISTORY:  Family History   Problem Relation Age of Onset     DIABETES Mother      Unknown/Adopted Father        SOCIAL HISTORY:  Social History     Social History     Marital status:      Spouse name: N/A     Number of children: N/A     Years of education: N/A     Social History Main Topics     Smoking status: Former Smoker     Smokeless tobacco: Former User     Alcohol use Yes     Drug use: No     Sexual activity: No     Other Topics Concern     Caffeine Concern No     maybe some iced tea in the summer      Special Diet No     Exercise Yes     tap dance and walk every day      Social History Narrative         Review of Systems:  Skin:  Negative       Eyes:  Positive for glasses    ENT:  Negative      Respiratory:  Negative       Cardiovascular:    palpitations;Positive for;fatigue    Gastroenterology: Negative      Genitourinary:  Negative      Musculoskeletal:  Positive for joint pain;arthritis;back pain    Neurologic:  Negative      Psychiatric:  Negative      Heme/Lymph/Imm:  Negative      Endocrine:  Negative        Physical Exam:  Vitals: /60 (BP Location: Right arm, Patient Position: Sitting, Cuff Size: Adult Regular)  Pulse 64  Ht 1.499 m (4' 11\")  Wt 54.7 kg (120 lb 9.6 oz)  Breastfeeding? No  BMI 24.36 kg/m2    Constitutional:           Skin:             Head:           Eyes:           Lymph:      ENT:           Neck:           Respiratory:            Cardiac:                                                           GI:           Extremities and Muscular Skeletal:                 Neurological:           Psych:           Recent Lab Results:  LIPID RESULTS:  No results found for: CHOL, HDL, LDL, TRIG, CHOLHDLRATIO    LIVER ENZYME " RESULTS:  Lab Results   Component Value Date    AST 22 03/28/2018    ALT 28 03/28/2018       CBC RESULTS:  Lab Results   Component Value Date    WBC 12.8 (H) 04/08/2017    RBC 4.68 04/08/2017    HGB 14.1 04/08/2017    HCT 43.9 04/08/2017    MCV 94 04/08/2017    MCH 30.1 04/08/2017    MCHC 32.1 04/08/2017    RDW 13.4 04/08/2017     04/08/2017       BMP RESULTS:  Lab Results   Component Value Date     03/28/2018    POTASSIUM 3.8 03/28/2018    CHLORIDE 109 03/28/2018    CO2 27 03/28/2018    ANIONGAP 7 03/28/2018     (H) 03/28/2018    BUN 17 03/28/2018    CR 0.80 03/28/2018    GFRESTIMATED 67 03/28/2018    GFRESTBLACK 81 03/28/2018    IWONA 8.7 03/28/2018        A1C RESULTS:  No results found for: A1C    INR RESULTS:  Lab Results   Component Value Date    INR 0.98 10/15/2016    INR 0.92 06/04/2010           PAULA Florian CNP  [address]  [place] [address]

## 2018-04-13 NOTE — PROGRESS NOTES
Service Date: 04/12/2018      HISTORY OF PRESENT ILLNESS:  I saw Terese Nassar today, who is 89.  She is a very bright and vivacious woman who as usual comes with her daughter.  We have seen her because of a history of paroxysmal supraventricular tachycardia and a tendency to frequent PVCs.  All of this remarkably is quite asymptomatic.  She cannot feel her heart beating irregularly and does not have dizziness or syncope.      Recent laboratory work a month ago showed potassium 3.8, creatinine 0.8.  Serum albumin was a hair low at 3.3.  TSH 1.3.  An echocardiogram done at the end of last month showed normal LV and RV systolic function.  Left atrium was moderately dilated.  There was no mitral insufficiency.  Aortic valve was relatively normal.  Rights heart pressures were normal at 24 mmHg plus right atrial pressure.  Quite a good echo for being 89.      She has been on diltiazem 240 mg a day and tolerated this with no obvious side effects.      Accordingly, she has PVCs.  They are relatively asymptomatic.  She has a relatively normal heart by echo, and accordingly, I do not feel further assessment or treatment is needed with regards to her PVCs.  She is having nothing to suggest frequent or symptomatic PSVTs.  There were 50,000 PVCs.  There was one 3-beat run at 170 beats a minute and significant episodes of bigeminy and trigeminy.  At age 89 and absolutely no symptoms, I have left her on the diltiazem.  Should she become more symptomatic, a consideration of low-dose beta blocker might be entertained, but as it is, she says she feels fine and has no symptoms.      PHYSICAL EXAMINATION:  Her physical exam is the same as during her last visit.  Rhythm is regular, with a rare skipped beat.  Blood pressure 120/60, heart rate 64 beats a minute.  She weighs 120 pounds at 4 feet 11.      IMPRESSION:    1.  Rather frequent but well-tolerated asymptomatic PVCs.   2.  No signs of significant heart disease otherwise.   3.  No  signs of significant electrolyte abnormality.  It would minimize or try to eliminate any risk of hypokalemia.      She looks well and she feels well.  We agreed to get together should she become symptomatic.  If you have questions or concerns or disagreements, give me a call.      IMPRESSION:   1.  Relatively normal heart.   2.  Relatively quiet paroxysmal supraventricular tachycardia on diltiazem.   3.  Frequent PVCs but not enough to be symptomatic.  Low-dose beta blocker might be trialed in the future should they become more marked.      Over 35 minutes was spent doing the consult, reviewing old records, imaging, monitor, EKG, lab work, blood work, echo, etc.      cc:   [place] [address]         MABLE YOUSIF MD, [facility]             D: 2018   T: 2018   MT: KEON      Name:     TAIWO HERNANDEZ   MRN:      [id number]        Account:      [id number]   :      1928           Service Date: 2018      Document: [id number]

## 2019-01-03 DIAGNOSIS — I47.19 NARROW COMPLEX TACHYCARDIA (H): ICD-10-CM

## 2019-01-03 RX ORDER — DILTIAZEM HYDROCHLORIDE 240 MG/1
CAPSULE, COATED, EXTENDED RELEASE ORAL
Qty: 90 CAPSULE | Refills: 0 | Status: SHIPPED | OUTPATIENT
Start: 2019-01-03 | End: 2019-04-01

## 2019-04-01 DIAGNOSIS — I47.19 NARROW COMPLEX TACHYCARDIA (H): ICD-10-CM

## 2019-04-01 RX ORDER — DILTIAZEM HYDROCHLORIDE 240 MG/1
CAPSULE, COATED, EXTENDED RELEASE ORAL
Qty: 90 CAPSULE | Refills: 0 | Status: SHIPPED | OUTPATIENT
Start: 2019-04-01

## 2019-04-01 NOTE — TELEPHONE ENCOUNTER
Last Office Visit: 4/12/18    Last Labs/EKG: 3/28/2018 BMP    Follow-up Scheduled: No follow-up scheduled at this time. No follow-up time frame was given in the last provider note on 4/12/2018.    Prescription Sent to: [facility]

## 2019-11-19 ENCOUNTER — APPOINTMENT (OUTPATIENT)
Age: 84
Setting detail: DERMATOLOGY
End: 2019-11-19

## 2019-11-19 VITALS — RESPIRATION RATE: 14 BRPM | HEIGHT: 60 IN | WEIGHT: 115 LBS

## 2019-11-19 DIAGNOSIS — L20.89 OTHER ATOPIC DERMATITIS: ICD-10-CM

## 2019-11-19 DIAGNOSIS — B35.4 TINEA CORPORIS: ICD-10-CM

## 2019-11-19 PROBLEM — L20.84 INTRINSIC (ALLERGIC) ECZEMA: Status: ACTIVE | Noted: 2019-11-19

## 2019-11-19 PROCEDURE — OTHER PRESCRIPTION: OTHER

## 2019-11-19 PROCEDURE — OTHER ADDITIONAL NOTES: OTHER

## 2019-11-19 PROCEDURE — 99214 OFFICE O/P EST MOD 30 MIN: CPT

## 2019-11-19 PROCEDURE — OTHER COUNSELING: OTHER

## 2019-11-19 RX ORDER — TRIAMCINOLONE ACETONIDE 1 MG/G
0.1% CREAM TOPICAL BID
Qty: 1 | Refills: 1 | Status: ERX | COMMUNITY
Start: 2019-11-19

## 2019-11-19 RX ORDER — KETOCONAZOLE 20 MG/G
2% CREAM TOPICAL BID
Qty: 1 | Refills: 0 | Status: ERX | COMMUNITY
Start: 2019-11-19

## 2019-11-19 ASSESSMENT — LOCATION ZONE DERM
LOCATION ZONE: ARM
LOCATION ZONE: LEG

## 2019-11-19 ASSESSMENT — LOCATION DETAILED DESCRIPTION DERM
LOCATION DETAILED: LEFT PROXIMAL DORSAL FOREARM
LOCATION DETAILED: RIGHT PROXIMAL DORSAL FOREARM
LOCATION DETAILED: RIGHT DISTAL PRETIBIAL REGION

## 2019-11-19 ASSESSMENT — LOCATION SIMPLE DESCRIPTION DERM
LOCATION SIMPLE: LEFT FOREARM
LOCATION SIMPLE: RIGHT PRETIBIAL REGION
LOCATION SIMPLE: RIGHT FOREARM

## 2019-11-19 NOTE — PROCEDURE: ADDITIONAL NOTES
Detail Level: Simple
Additional Notes: Discussed with patient to continue her normal bathing/skin care routine, and to continue using CeraVe moisturizer after each shower

## 2020-12-03 ENCOUNTER — APPOINTMENT (OUTPATIENT)
Dept: MRI IMAGING | Facility: CLINIC | Age: 85
End: 2020-12-03
Payer: MEDICARE

## 2020-12-03 ENCOUNTER — APPOINTMENT (OUTPATIENT)
Dept: CT IMAGING | Facility: CLINIC | Age: 85
End: 2020-12-03
Attending: EMERGENCY MEDICINE
Payer: MEDICARE

## 2020-12-03 ENCOUNTER — HOSPITAL ENCOUNTER (EMERGENCY)
Facility: CLINIC | Age: 85
Discharge: OTHER INSTITUTION | End: 2020-12-03
Attending: EMERGENCY MEDICINE | Admitting: EMERGENCY MEDICINE
Payer: MEDICARE

## 2020-12-03 VITALS
SYSTOLIC BLOOD PRESSURE: 156 MMHG | HEART RATE: 134 BPM | BODY MASS INDEX: 24.04 KG/M2 | DIASTOLIC BLOOD PRESSURE: 119 MMHG | OXYGEN SATURATION: 96 % | WEIGHT: 119.05 LBS | TEMPERATURE: 98 F | RESPIRATION RATE: 20 BRPM

## 2020-12-03 DIAGNOSIS — I63.312 CEREBROVASCULAR ACCIDENT (CVA) DUE TO THROMBOSIS OF LEFT MIDDLE CEREBRAL ARTERY (H): ICD-10-CM

## 2020-12-03 LAB
ALBUMIN SERPL-MCNC: 3.6 G/DL (ref 3.4–5)
ALP SERPL-CCNC: 64 U/L (ref 40–150)
ALT SERPL W P-5'-P-CCNC: 30 U/L (ref 0–50)
ANION GAP SERPL CALCULATED.3IONS-SCNC: 8 MMOL/L (ref 3–14)
APTT PPP: 28 SEC (ref 22–37)
AST SERPL W P-5'-P-CCNC: 22 U/L (ref 0–45)
BASOPHILS # BLD AUTO: 0 10E9/L (ref 0–0.2)
BASOPHILS NFR BLD AUTO: 0.3 %
BILIRUB SERPL-MCNC: 0.5 MG/DL (ref 0.2–1.3)
BUN SERPL-MCNC: 15 MG/DL (ref 7–30)
CALCIUM SERPL-MCNC: 9.1 MG/DL (ref 8.5–10.1)
CHLORIDE SERPL-SCNC: 108 MMOL/L (ref 94–109)
CO2 SERPL-SCNC: 27 MMOL/L (ref 20–32)
CREAT SERPL-MCNC: 0.78 MG/DL (ref 0.52–1.04)
DIFFERENTIAL METHOD BLD: NORMAL
EOSINOPHIL # BLD AUTO: 0.4 10E9/L (ref 0–0.7)
EOSINOPHIL NFR BLD AUTO: 5.9 %
ERYTHROCYTE [DISTWIDTH] IN BLOOD BY AUTOMATED COUNT: 13.1 % (ref 10–15)
GFR SERPL CREATININE-BSD FRML MDRD: 66 ML/MIN/{1.73_M2}
GLUCOSE SERPL-MCNC: 167 MG/DL (ref 70–99)
HCT VFR BLD AUTO: 44.9 % (ref 35–47)
HGB BLD-MCNC: 14.7 G/DL (ref 11.7–15.7)
IMM GRANULOCYTES # BLD: 0 10E9/L (ref 0–0.4)
IMM GRANULOCYTES NFR BLD: 0.1 %
INR PPP: 0.99 (ref 0.86–1.14)
LABORATORY COMMENT REPORT: NORMAL
LYMPHOCYTES # BLD AUTO: 2.7 10E9/L (ref 0.8–5.3)
LYMPHOCYTES NFR BLD AUTO: 39.2 %
MCH RBC QN AUTO: 30.9 PG (ref 26.5–33)
MCHC RBC AUTO-ENTMCNC: 32.7 G/DL (ref 31.5–36.5)
MCV RBC AUTO: 94 FL (ref 78–100)
MONOCYTES # BLD AUTO: 0.7 10E9/L (ref 0–1.3)
MONOCYTES NFR BLD AUTO: 10.4 %
NEUTROPHILS # BLD AUTO: 3 10E9/L (ref 1.6–8.3)
NEUTROPHILS NFR BLD AUTO: 44.1 %
NRBC # BLD AUTO: 0 10*3/UL
NRBC BLD AUTO-RTO: 0 /100
PLATELET # BLD AUTO: 159 10E9/L (ref 150–450)
POTASSIUM SERPL-SCNC: 3.9 MMOL/L (ref 3.4–5.3)
PROT SERPL-MCNC: 7.2 G/DL (ref 6.8–8.8)
RBC # BLD AUTO: 4.76 10E12/L (ref 3.8–5.2)
SARS-COV-2 RNA SPEC QL NAA+PROBE: NEGATIVE
SARS-COV-2 RNA SPEC QL NAA+PROBE: NORMAL
SODIUM SERPL-SCNC: 143 MMOL/L (ref 133–144)
SPECIMEN SOURCE: NORMAL
SPECIMEN SOURCE: NORMAL
WBC # BLD AUTO: 6.9 10E9/L (ref 4–11)

## 2020-12-03 PROCEDURE — 99285 EMERGENCY DEPT VISIT HI MDM: CPT | Mod: 25

## 2020-12-03 PROCEDURE — 80053 COMPREHEN METABOLIC PANEL: CPT

## 2020-12-03 PROCEDURE — 96365 THER/PROPH/DIAG IV INF INIT: CPT | Mod: 59

## 2020-12-03 PROCEDURE — 85730 THROMBOPLASTIN TIME PARTIAL: CPT

## 2020-12-03 PROCEDURE — C9803 HOPD COVID-19 SPEC COLLECT: HCPCS

## 2020-12-03 PROCEDURE — 250N000011 HC RX IP 250 OP 636: Performed by: EMERGENCY MEDICINE

## 2020-12-03 PROCEDURE — 70551 MRI BRAIN STEM W/O DYE: CPT

## 2020-12-03 PROCEDURE — 99291 CRITICAL CARE FIRST HOUR: CPT | Mod: GC | Performed by: PSYCHIATRY & NEUROLOGY

## 2020-12-03 PROCEDURE — 85025 COMPLETE CBC W/AUTO DIFF WBC: CPT

## 2020-12-03 PROCEDURE — 93005 ELECTROCARDIOGRAM TRACING: CPT

## 2020-12-03 PROCEDURE — 258N000003 HC RX IP 258 OP 636: Performed by: PSYCHIATRY & NEUROLOGY

## 2020-12-03 PROCEDURE — U0003 INFECTIOUS AGENT DETECTION BY NUCLEIC ACID (DNA OR RNA); SEVERE ACUTE RESPIRATORY SYNDROME CORONAVIRUS 2 (SARS-COV-2) (CORONAVIRUS DISEASE [COVID-19]), AMPLIFIED PROBE TECHNIQUE, MAKING USE OF HIGH THROUGHPUT TECHNOLOGIES AS DESCRIBED BY CMS-2020-01-R: HCPCS | Performed by: EMERGENCY MEDICINE

## 2020-12-03 PROCEDURE — 85610 PROTHROMBIN TIME: CPT

## 2020-12-03 PROCEDURE — 250N000009 HC RX 250: Performed by: EMERGENCY MEDICINE

## 2020-12-03 PROCEDURE — 250N000011 HC RX IP 250 OP 636: Performed by: PSYCHIATRY & NEUROLOGY

## 2020-12-03 PROCEDURE — 0042T CT HEAD PERFUSION WITH CONTRAST: CPT

## 2020-12-03 PROCEDURE — 70450 CT HEAD/BRAIN W/O DYE: CPT | Mod: XS

## 2020-12-03 PROCEDURE — 70496 CT ANGIOGRAPHY HEAD: CPT

## 2020-12-03 RX ORDER — IOPAMIDOL 755 MG/ML
120 INJECTION, SOLUTION INTRAVASCULAR ONCE
Status: COMPLETED | OUTPATIENT
Start: 2020-12-03 | End: 2020-12-03

## 2020-12-03 RX ORDER — DILTIAZEM HYDROCHLORIDE 5 MG/ML
10 INJECTION INTRAVENOUS ONCE
Status: COMPLETED | OUTPATIENT
Start: 2020-12-03 | End: 2020-12-03

## 2020-12-03 RX ADMIN — ALTEPLASE 43.7 MG: KIT at 16:02

## 2020-12-03 RX ADMIN — ALTEPLASE 4.9 MG: KIT at 15:59

## 2020-12-03 RX ADMIN — DILTIAZEM HYDROCHLORIDE 10 MG: 5 INJECTION INTRAVENOUS at 16:37

## 2020-12-03 RX ADMIN — DILTIAZEM HYDROCHLORIDE 10 MG: 5 INJECTION INTRAVENOUS at 16:54

## 2020-12-03 RX ADMIN — SODIUM CHLORIDE 50 ML: 9 INJECTION, SOLUTION INTRAVENOUS at 16:55

## 2020-12-03 RX ADMIN — IOPAMIDOL 120 ML: 755 INJECTION, SOLUTION INTRAVENOUS at 14:58

## 2020-12-03 RX ADMIN — SODIUM CHLORIDE 100 ML: 9 INJECTION, SOLUTION INTRAVENOUS at 14:58

## 2020-12-03 ASSESSMENT — ENCOUNTER SYMPTOMS
WEAKNESS: 0
SPEECH DIFFICULTY: 1
FACIAL ASYMMETRY: 1

## 2020-12-03 NOTE — ED NOTES
RN called pt's daughter Ashley to complete MRI checklist and provide update. Ashley stated that the patient is DNR/DNI and should remain that way. May call back with any questions, and would like an update when available [phone]

## 2020-12-03 NOTE — ED NOTES
Bed: ED08  Expected date:   Expected time:   Means of arrival:   Comments:  Marty Cisneros - F stroke alert eta 0630

## 2020-12-03 NOTE — ED TRIAGE NOTES
Last known well this morning at 1030, went downstairs at 1354 with slurred speech and R facial droop. By 1358, EMS arrived and all resolved except expressive aphasia. Strength normal.

## 2020-12-03 NOTE — ED PROVIDER NOTES
History   Chief Complaint:  Facial Droop and Aphasia     The history is provided by the EMS personnel.      Terese Nassar is a 89 year old female with history of hypertension and hyperlipidemia who presents with right sided facial droop and aphasia. EMS reports the patient had a last known well of 1030. She was coming down her apartment stairs toward the lobby at 1354 when it was noted that she was unable to speak and had right sided facial droop. EMS was called and on their arrival at 1358 her facial droop had resolved. Here she has ongoing aphasia. She had no weakness.     Allergies:  Sulfa Drugs    Medications:   Diltiazem  Simvastatin     Past Medical History:    Basal cell carcinoma  Gastroesophageal reflux disease  Hyperlipidemia  Hypertension  Mitral valve insufficiency   Narrow complex tachycardia     Past Surgical History:    Cardiac surgery    Family History:    Diabetes mellitus     Social History:  The patient was unaccompanied to the ED.  Smoking Status: former smoker  Smokeless Tobacco:   Alcohol Use: Yes  Drug Use: No  PCP: Hetal Trujillo     Review of Systems   Neurological: Positive for facial asymmetry and speech difficulty. Negative for weakness.   Unavailable secondary to the critical nature of the situation.  Physical Exam     Patient Vitals for the past 24 hrs:   BP Temp Temp src Pulse Resp SpO2 Weight   12/03/20 1615 (!) 135/95 -- -- 138 15 -- --   12/03/20 1600 (!) 136/96 -- -- 141 20 92 % --   12/03/20 1545 (!) 141/99 -- -- 90 14 94 % --   12/03/20 1500 -- -- -- -- -- -- 54 kg (119 lb 0.8 oz)   12/03/20 1440 (!) 140/86 98  F (36.7  C) Oral 81 18 93 % --       Physical Exam    General: Resting comfortably on the [facility]    Dense expressive aphasia  Head:  The scalp, face, and head appear normal    Flattening of the right nasolabial fold  Eyes:  The pupils are equal, round, and reactive to light    There is no nystagmus    Extraocular muscles are intact    Conjunctivae and sclerae are  normal  ENT:    The nose is normal    Pinnae are normal    The oropharynx is normal    Uvula is in the midline  Neck:  Normal range of motion    There is no rigidity noted    There is no midline cervical spine pain/tenderness    Trachea is in the midline    No mass is detected  CV:  Regular rate and underlying rhythm     Later in the emergency department course she developed a narrow complex tachycardia in the 120s to 130s.  She has a history of this in the past.    Normal S1/S2, no S3/S4    No pathological murmur detected  Resp:  Lungs are clear    There is no tachypnea    Non-labored    No rales    No wheezing   GI:  Abdomen is soft, there is no rigidity    No distension    No tympani    No rebound tenderness     Non-surgical without peritoneal features  MS:  She has good motor strength in the left arm and left leg.  She can lift the right arm and right leg off the bed.    Skin:  No rash or acute skin lesions noted  Neuro: See stroke scale below.   Psych:  Awake. Alert.      Normal affect.  Appropriate interactions.  Lymph: No anterior cervical lymphadenopathy noted    National Institutes of Health Stroke Scale  Exam Interval: Baseline   Score    Level of consciousness: (0)   Alert, keenly responsive    LOC questions: (0)   Answers both questions correctly (dense expressive aphasia)    LOC commands: (0)   Performs both tasks correctly    Best gaze: (0)   Normal    Visual: (0)   No visual loss    Facial palsy: (1)   Minor paralysis (flat nasolabial fold, smile asymmetry)    Motor arm (left): (0)   No drift    Motor arm (right): (0)   No drift    Motor leg (left): (0)   No drift    Motor leg (right): (0)   No drift    Limb ataxia: (0)   Absent    Sensory: (0)   Normal- no sensory loss    Best language: (2)   Severe aphasia    Dysarthria: (0)   Normal    Extinction and inattention: (0)   No abnormality        Total Score:  3       Emergency Department Course   ECG:  ECG taken at 1632, ECG read at  1642  Supraventricular tachycardia with frequent premature ventricular complexes  Left axis deviation  Left ventricular hypertrophy with repolarization abnormality  Abnormal ECG  Rate 141 bpm. LA interval * ms. QRS duration 94 ms. QT/QTc 318/487 ms. P-R-T axes * -36 160.    Imaging:    MR Brain w/o Contrast  IMPRESSION:  1. Tiny hyperacute infarct involving the left insular cortex,  manifesting as a tiny area of cortical restricted diffusion without  corresponding T2 FLAIR signal change. No other areas of acute infarct  are identified.  2. Brain atrophy and presumed chronic small vessel ischemic disease.    The findings were discussed with Dr. Abebe by myself at 3:45 PM on  12/3/2020.  Reading per radiology     CT Head Perfusion w Contrast  IMPRESSION: There is a moderate-sized area of prolonged contrast  transit time in the left middle cerebral artery territory involving  the left insular cortex and frontal operculum, consistent with  oligemic tissue/ischemic penumbra. No definite core infarct by  perfusion criteria. Urgent neuroendovascular service consultation is  recommended.  Reading per radiology    CTA Head Neck with Contrast  IMPRESSION:   1. Occlusion of one of the M2 branches of the left middle cerebral  artery overlying the mid aspect of the left insular cortex, consistent  with acute thromboembolus.  2. No other intracranial high-grade central arterial stenosis or  large-vessel occlusion.  3. Mild bilateral carotid bifurcation atherosclerosis without  significant stenosis.    The findings from the noncontrast head CT and CTA head and neck were  discussed with Dr. Abebe by myself at 3:04 PM on 12/3/2020.   Reading per radiology    CT Head w/o Contrast  IMPRESSION:  1. No extended acute infarct signs or intracranial hemorrhage. Aspect  score = 10.  2. Brain atrophy and presumed chronic small vessel ischemic disease.  Reading per radiology     Laboratory:    CBC: WBC 6.9, HGB 14.7,   CMP: glucose  167(H) o/w WNL (Creatinine 0.78)  INR: 0.99  PTT: 28    Asymptomatic COVID19 Virus PCR by nasopharyngeal swab pending     Interventions:  1559 Alteplase 4.9 mg IV  1602 Ateplase 43.7 mg IV  1637 Diltiazem 10 mg IV  5:00 PM  Diltiazem 10 mg IV    Emergency Department Course:  Nursing notes and vitals reviewed.    1443 I performed an exam of the patient as documented above.     1446 I spoke with Dr. Rick of the Stroke Neurology service regarding patient's presentation, findings, and plan of care.     1506 I spoke with Dr. Mcmanus from Radiology regarding the patient's CT findings.     1515 I spoke with Dr. Rick regarding the patient's CT findings and plan of care.     1547 I spoke with Dr. Mcmanus from Radiology regarding the patient's MRI findings.     1630 I rechecked the patient. She is tachycardic to 140. Explained findings to the Patient.    4:59 PM  Gave the patient an additional 10 mg of diltiazem.  Heart rate is in the 120s.    We continue to await for an intensive care unit bed.  The patient will be signed out to Dr. Garcia at this time.  Paperwork has been completed but we do not yet know where she will go.    The plan for the patient will be to transfer to an outside ICU. The exact location is not yet determined. Final disposition per Dr. Bal.     Impression & Plan    Covid-19  June P Cesario was evaluated during a global COVID-19 pandemic, which necessitated consideration that the patient might be at risk for infection with the SARS-CoV-2 virus that causes COVID-19.   Applicable protocols for evaluation were followed during the patient's care.   COVID-19 was considered as part of the patient's evaluation. The plan for testing is:  a test was obtained during this visit.    CMS Diagnoses: The patient has stroke symptoms:         ED Stroke specific documentation           NIHSS PDF     Patient last known well time: 1030  ED Provider first to bedside at: 1443  CT Results received at: 1506    tPA:    Administered. Risks and benefits of tPA were discussed with Patient prior to administration.    If treating with tPA: Ensure SBP<185 and DBP<105 prior to treatment with IV tPA.  Administering IV tPA after treatment with IV labetalol, hydralazine, or nicardipine is reasonable once BP control is established.      National Institutes of Health Stroke Scale (Baseline)  Time Performed: 1443     Score    Level of consciousness: (0)   Alert, keenly responsive    LOC questions: (0)   Answers both questions correctly    LOC commands: (0)   Performs both tasks correctly    Best gaze: (0)   Normal    Visual: (0)   No visual loss    Facial palsy: (1)   Minor paralysis (flat nasolabial fold, smile asymmetry)    Motor arm (left): (0)   No drift    Motor arm (right): (0)   No drift    Motor leg (left): (0)   No drift    Motor leg (right): (0)   No drift    Limb ataxia: (0)   Absent    Sensory: (0)   Normal- no sensory loss    Best language: (2)   Severe aphasia    Dysarthria: (0)   Normal    Extinction and inattention: (0)   No abnormality        Total Score:  3      5:01 PM  Repeat stroke assessment was performed at 1701.  The patient still has a dense aphasia.  She can follow commands by moving her extremities.      Medical Decision Making:  Terese Nassar is a 89 year old female who presents to the emergency department today for evaluation of acute expressive aphasia and right facial droop.  CT stroke protocol revealed a blockage in the M2 segment of the left middle cerebral artery.  MRI confirmed an acute infarct of likely acute duration.  tPA has been administered.  The patient is not an intra-arterial intervention candidate at this juncture given the location.  Thus far the patient has not shown significant improvement with the tissue plasminogen activator.    Patient developed a narrow complex tachycardia in the 120-130 range.  She does take diltiazem at baseline.      Critical Care time was 75 minutes for this patient  excluding procedures.     Diagnosis:    ICD-10-CM    1. Cerebrovascular accident (CVA) due to thrombosis of left middle cerebral artery (H)  I63.312 Comprehensive metabolic panel   Narrow complex tachycardia, supraventricular              Disposition:   Care of the patient was transferred to my colleague Dr. Bal pending bed placement, 1700    Scribe Disclosure:  I, Natasha Ordoñez, am serving as a scribe at 2:42 PM on 12/3/2020 to document services personally performed by Clayton Abebe MD based on my observations and the provider's statements to me.   [facility] EMERGENCY DEPARTMENT       Clayton Abebe MD  12/03/20 2833

## 2020-12-03 NOTE — CONSULTS
[facility]    Stroke Consult Note    Reason for Consult: Stroke Code    Chief Complaint: Facial Droop and Aphasia      HPI  Terese P Cesario is a 89 year old female with PMH of Afib not on Anticoagulation (confirmed by patients daughter, no clear reason was provided - she refused any bleeding history) who is reported to be living independently (mRS 2 - walks with walker without other assistance) - who is seen as a stroke alert for right facial droop and aphasia.    Per patients daughter, she was LKW 10:30 AM by her friend and then walked out of her apartment at about 12:30 PM and was noted to have right facial droop and aphasia.    On exam in ED: NIHSS was 5. Head CT was unremarkable for acute pathology. CTA showed distal L M2 occlusion. CTP showed a 15 ml penumbra. Hyperacute MRI brain showed left insular/opercular infarct without FLAIR signal change.    I called patients daughter again and we discussed role of MRI imaging and utility of delayed window tPA administration in view of patients debilitating symptoms.  We discussed risks and benefits of IVT and MT. We decided to proceed with iv tPA.     TPA Treatment   Administered. Risks and benefits of tPA were discussed with Patient and Family prior to administration.    Endovascular Treatment  Proximal vessel occlusion present, but endovascular treatment not initiated due to resolving symptoms and low NIHSS and patients goals of care    Impression  Ischemic Stroke due to cardioembolism    Recommendations  Acute Ischemic Stroke (post tPA) Recommendations  - Close monitoring and neurochecks per post-tPA orders for any evidence of hemorrhagic transformation or allergic reaction  - Labetalol PRN to maintain BP < 180/105  - Hold all anti-thrombotic and anti-coagulant medications for 24 hrs post-IV Alteplase (tPA)  - Hold pharmacologic VTE prophylaxis for 24 hrs post-IV Alteplase (tPA)  - Statin:  atorvastatin 40mg HS   - Repeat HCT 24 hrs  "post-tPA  - MRI Stroke Protocol  - MRA Stroke Protocol  - TTE without Bubble Study  - Telemetry, EKG  - Bedside Glucose Monitoring  - A1c, Lipid Panel, Troponin x 3  - PT/OT/SLP  - Stroke Education  - Depression Screen  - Apnea Screen  - Euthermia, Euglycemia    The Stroke Staff is Dr. Shaver.    JUSTIN JOHNSON MD  Vascular Neurology Fellow  To page me or covering stroke neurology team member, click here: [email or web address]   Choose \"On Call\" tab at top, then search dropdown box for \"Neurology Adult\", select location, press Enter, then look for stroke/neuro ICU/telestroke.    ______________________________________________________    Past Medical History   Past Medical History:   Diagnosis Date     Basal cell carcinoma 2014    on scalp     Cataract      GERD (gastroesophageal reflux disease)      HLD (hyperlipidemia)      Hypertension      Impaired fasting glucose 2005     Mitral valve insufficiency     SBE prophylaxis per PMD     Narrow complex tachycardia (H) 2010     Palpitations      Past Surgical History   Past Surgical History:   Procedure Laterality Date     CARDIAC SURGERY       ESOPHAGOSCOPY, GASTROSCOPY, DUODENOSCOPY (EGD), COMBINED N/A 10/16/2016    Procedure: COMBINED ESOPHAGOSCOPY, GASTROSCOPY, DUODENOSCOPY (EGD), BIOPSY SINGLE OR MULTIPLE;  Surgeon: Kane Salazar MD;  Location:  GI     Medications   Home Meds  Prior to Admission medications    Medication Sig Start Date End Date Taking? Authorizing Provider   acetaminophen (TYLENOL) 325 MG tablet Take 325-650 mg by mouth every 6 hours as needed for mild pain    Reported, Patient   diltiazem ER COATED BEADS (CARDIZEM CD) 240 MG 24 hr capsule Take 1 capsule daily 4/1/19   Terese Pineda, APRN CNP   hydrocortisone (ANUSOL-HC) 2.5 % cream Place 1 Application rectally 3 times daily as needed for itching 4/3/17   Reported, Patient   polyethylene glycol (MIRALAX) powder Take 17 g (1 capful) by mouth daily 4/9/17   Mignon Su PAErrolC   simvastatin (ZOCOR) 10 MG " tablet Take 10 mg by mouth At Bedtime    Unknown, Entered By History       Allergies   Allergies   Allergen Reactions     Sulfa Drugs Nausea     Family History   Family History   Problem Relation Age of Onset     Diabetes Mother      Unknown/Adopted Father      Social History   Social History     Tobacco Use     Smoking status: Former Smoker     Smokeless tobacco: Former User   Substance Use Topics     Alcohol use: Yes     Drug use: No       Review of Systems   The 10 point Review of Systems is negative other than noted in the HPI or here.        PHYSICAL EXAMINATION  Temp:  [98  F (36.7  C)] 98  F (36.7  C)  Pulse:  [] 138  Resp:  [14-20] 15  BP: (135-141)/(86-99) 135/95  SpO2:  [92 %-94 %] 92 %     General: sitting in bed in no acute distress.  Mental status: Awake, alert, comprehension to simple commands intact but impaired to complex commands. Naming and repetition impaired.     CN:  PERRL and brisk. EOMI. VFF. No field cut. R facial droop. Facial sensation is intact to pinprick in all 3 divisions bilaterally. Palate elevates symmetrically.  Head turning normal. Tongue is midline with normal movements.      Motor:   5/5 in all 4 limbs    Sensory:  Intact to touch bilaterally symmetrical.    Cerebellar:  No ataxia on FNT.     Gait:   Deferred       Stroke Scales    NIHSS  Interval baseline (12/03/20 1656)   Interval Comments     1a. Level of Consciousness 0-->Alert, keenly responsive   1b. LOC Questions 1-->Answers one question correctly   1c. LOC Commands 0-->Performs both tasks correctly   2.   Best Gaze 0-->Normal   3.   Visual 0-->No visual loss   4.   Facial Palsy 1-->Minor paralysis (flattened nasolabial fold, asymmetry on smiling)   5a. Motor Arm, Left 0-->No drift, limb holds 90 (or 45) degrees for full 10 secs   5b. Motor Arm, Right 0-->No drift, limb holds 90 (or 45) degrees for full 10 secs   6a. Motor Leg, Left 0-->No drift, leg holds 30 degree position for full 5 secs   6b. Motor Leg, right  0-->No drift, leg holds 30 degree position for full 5 secs   7.   Limb Ataxia 0-->Absent   8.   Sensory 0-->Normal, no sensory loss   9.   Best Language 2-->Severe aphasia, all communication is through fragmentary expression, great need for inference, questioning, and guessing by the listener. Range of information that can be exchanged is limited, listener carries burden of. . . (see row details)   10. Dysarthria 2-->Severe dysarthria, patients speech is so slurred as to be unintelligible in the absence of or out of proportion to any dysphasia, or is mute/anarthric   11. Extinction and Inattention  0-->No abnormality   Total 6 (12/03/20 1656)       Imaging  I personally reviewed all imaging; relevant findings per HPI.     Lab Results Data   CBC  Recent Labs   Lab 12/03/20  1438   WBC 6.9   RBC 4.76   HGB 14.7   HCT 44.9        Basic Metabolic Panel    Recent Labs   Lab 12/03/20  1438      POTASSIUM 3.9   CHLORIDE 108   CO2 27   BUN 15   CR 0.78   *   IWONA 9.1     Liver Panel  Recent Labs   Lab 12/03/20  1438   PROTTOTAL 7.2   ALBUMIN 3.6   BILITOTAL 0.5   ALKPHOS 64   AST 22   ALT 30     INR    Recent Labs   Lab Test 12/03/20  1438 10/15/16  0800   INR 0.99 0.98      Lipid Profile  No lab results found.  A1C  No lab results found.  Troponin I  No results for input(s): TROPI in the last 168 hours.       Stroke Code / Stroke Consult Data Data   Stroke Code Data  (for stroke code without tele)  Stroke code activated 12/03/20   1444   First stroke provider response 12/03/20   1445   Last known normal 12/03/20   1030   Time of discovery   (or onset of symptoms) 12/03/20   1230   Head CT read by me 12/03/20   1450   Was stroke code de-escalated? No

## 2020-12-03 NOTE — ED PROVIDER NOTES
Acute expressive aphasia, LKW 3 hrs prior to presentation, left M2 branch occlusion, no extremity weakness, too distal for thrombectomy, tpa given, No change following tpa, history of narrow complex tachycardia, dilt given 10 mg -> 10 mg     Signed out from Dr. Abebe.     Looking for Neuro ICU Bed. None available at [facility] or within the [facility] system currently.       ED Course:   1845 I evaluated the patient.   1853 I spoke with Dr. Gonzalez of the Intensivist from [facility] regarding the patient's presentation, findings, and plan of care.   2001 I spoke with Hospitalist and Neurologist of [facility] regarding the patient's plan of care.

## 2020-12-04 LAB — INTERPRETATION ECG - MUSE: NORMAL

## 2020-12-04 NOTE — ED NOTES
Patient is very restless in bed. RN and EDT attempted to boost patient in bed however she continues to be very restless in bed.

## 2020-12-04 NOTE — ED NOTES
RN and EDT continue to attempt to position patient in an area of comfort. Patient will rest calmly for a few minutes, heart rate will decrease, however patients heart rate again increases when patient attempts to move around in bed. Patient appears to get frustrated due to inability to express her needs but is able to follow most commands

## 2020-12-04 NOTE — ED NOTES
Rn spoke to HAYDEN Dc at [facility] station 5 and gave nurse to nurse report. Patient appears to understand that she will be transferred however is unable to verbally express this. EMS were given report for transport over to [place]. No drips were running on transfer. Vitals remain unchanged prior to transport.

## 2020-12-04 NOTE — ED NOTES
Report received. Patient visualized. Patients vitals remain unchanged from previous documentation. Patient continues to be tachycardic and on 2L o2 to maintains saturations. Neuro assessments continued Q30 minutes post TPA per orders. Patient is resting in bed awaiting transport when bed is available. Will continue to monitor.

## 2020-12-04 NOTE — ED NOTES
RN called patients daughter and informed her of her mother being transferred to [facility]. Patients mother aware of mothers present condition and is aware of the visiting hours and policy at [facility].

## 2020-12-08 LAB — INTERPRETATION ECG - MUSE: NORMAL

## 2021-02-09 PROBLEM — I63.9 CEREBROVASCULAR ACCIDENT (H): Status: ACTIVE | Noted: 2021-02-09

## 2021-02-09 PROBLEM — I69.320 APHASIA AS LATE EFFECT OF CEREBROVASCULAR ACCIDENT: Status: ACTIVE | Noted: 2021-02-09

## 2021-02-09 PROBLEM — I47.10 PAROXYSMAL SUPRAVENTRICULAR TACHYCARDIA (H): Status: ACTIVE | Noted: 2019-06-18

## 2021-02-09 PROBLEM — I48.91 ATRIAL FIBRILLATION WITH RVR (H): Status: ACTIVE | Noted: 2021-02-09

## 2022-08-26 NOTE — ED PROVIDER NOTES
History     Chief Complaint:  Constipation    HPI   Terese Nassar is a 88 year old female who presents to the emergency department today for evaluation of constipation. Patient reports not having an BM in approximately 1 week and was evaluated in UC earlier today. She states that constipation is a longstanding issue for her. Patient was given oral laxatives, but had no relief, prompting here visit here today. Patient does notes some brown stool that leaks. She denies recent narcotic use. Patient also notes rectal pain and difficulty controlling stools when she does feel the need to go. Patient denies fever, blood in stool, urinary symptoms, or recent illness. No other concerns were voiced at this time.     Allergies:  Sulfa drugs: nausea      Medications:    Diltiazem  Simvastatin     Past Medical History:    Hypertension  Narrow complex tachycardia  Hyperlipidemia  Palpitations  Mitral valve insufficiency  Cataract  GERD  Impaired fasting glucose  Basal cell carcinoma      Past Surgical History:   Cardiac surgery      Family History:   Mother: diabetes mellitus      Social History:  Marital status:   Former smoker, positive for alcohol use.  The patient presents with neighbor.     Review of Systems   Constitutional: Negative for fever.   Gastrointestinal: Positive for constipation. Negative for blood in stool.   Genitourinary: Negative for dysuria and hematuria.   All other systems reviewed and are negative.    Physical Exam   First Vitals:  BP: 152/77  Pulse: 99  Temp: 97.6  F (36.4  C)  Resp: 14  SpO2: 96 %    Physical Exam  General: Patient is anxious, but resting comfortably in gurney.   HEENT:   The scalp and head appear normal    The pupils are equal, round, and reactive to light    Extraocular muscles are intact.    The nose is normal.    The oropharynx is normal.      Uvula is in the midline.  There is no peritonsillar abscess.  Neck:  Normal range of motion.    Lungs:  Clear.      No rales, no  wheezing.      There is no tachypnea.  Non-labored.  Cardiac: Regular rate.      Normal S1 and S2.      No S3 or S4.      No pathological murmur.      No pericardial rub.  Abdomen: Soft. No distension. No tympani. No rebound. Non-tender.    Increased bowel sounds throughout the lower abdomen.   Rectal:  External hemorrhoids in the 9 o'clock position. Nothing is thrombosed.    Large plug of felix like stool, about 3 cm up rectal verge.   Lymph: No anterior or posterior cervical lymphadenopathy noted.  MS:  Normal tone.      Normal movement of all extremities.    Neuro:  Normal mentation.  No focal motor or sensory changes.      Speech normal.  Psych:  Awake.     Alert.      Normal affect.      Appropriate interactions.  Skin:  No rash.      No lesions.      Emergency Department Course     ECG:  ECG taken at 1954, ECG read at 2000  Supraventricular tachycardia with frequent premature ventricular complexes  Left ventricular hypertrophy with repolarization abnormality  Abnormal ECG  Rate 143 bpm. RI interval *. QRS duration 88. QT/QTc 312/481. P-R-T axes * -27 149.    Imaging:  Imaging findings were communicated with the patient who voiced understanding of the findings.    Abdomen xray 2 views:  Nonspecific bowel gas pattern. No evidence of bowel  obstruction. No free air. Small presumed phleboliths in the pelvis.  Stable exostosis or hypertrophic spur in the right innominate bone  above the acetabulum.  Reading per radiology    Laboratory:  Laboratory findings were communicated with the patient who voiced understanding of the findings.  CBC: WBC 12.8(H), HGB 14.1,    BMP: Glucose: 134(H), GFR Estimate: 60(L), Creatinine 0.88    Procedure:  Procedure: Fecal disimpaction  Using lubrication, gentle digital rectal exam revealed a large amount of felix like stool noted in the rectal vault. Attempted to pull it out and break it up. This was unsuccessful. Patient tolerated procedure well.    Interventions:  1926 Pink  lady enema 286 mL Rectal  2042 Pink lady enema 286 mL Rectal  2307 Pink lady enema 286 mL Rectal     Emergency Department Course:  Nursing notes and vitals reviewed.  I performed an exam of the patient as documented above.   IV was inserted and blood was drawn for laboratory testing, results above.    1950: Nursing notes no relief with first enema here. Another is ordered.   At 2006 the patient was rechecked and updated on EKG read. We discussed indication for further evaluation and treatment.     At 2335 the patient was rechecked and states that she has not had relief with the above interventions. We discussed disposition options. I discussed the treatment plan with the patient. They expressed understanding of this plan and consented to admission.    2350: I discussed the patient with Dr Amaya, who will admit the patient to a monitored bed for further evaluation and treatment.    I personally reviewed the laboratory results with the Patient and answered all related questions prior to admission.     Impression & Plan      Medical Decision Making:  Terese Nassar is a 88 year old female who presented with constipation. She has had this before. She denies any exciting factor. It was noted on clinical exam that she was impacted with a lot of felix-like stool. She received several enemas without good results. I tried to digitally disimpact her and, although I could feel a large amount of stool in the rectal vault, I could not really pull it out well. I did remove it piece by piece. Thereafter she started having larger chunks come out, however to fully disimpact her I think that she will need to be admitted and will start from above. I will give her half a bottle of Mag citrate at this time. We will continue IV fluids. Her blood work thus far is normal, including electrolytes, CBC with differential. I spoke with Dr Lazarus Amaya, the hospitalists. He will continue to make sure she evacuates fully.     At one point here I  noted that she had a high HR when I first evaluated her. She told me that this was all related to anxiety and her friend concurred. However the nurse noted on the monitors that she was in the 150's. We did an EKG which showed PSVT. I was going to give her Adenosine, but she did a vasovagal maneuver while straining to have a bowel movement and converted.     Diagnosis:  1. Constipation  2. Episode of PSVT, resolved.     Disposition:   Admission under hospitalists service.    Scribe Disclosure:  I, Fernando Jules, am serving as a scribe at 7:08 PM on 4/8/2017 to document services personally performed by Deshawn Lozano MD, based on my observations and the provider's statements to me.  [facility] EMERGENCY DEPARTMENT         Deshawn Lozano MD  04/09/17 0045     Quality 265: Biopsy Follow-Up: Biopsy results reviewed, communicated, tracked, and documented Detail Level: Simple Quality 431: Preventive Care And Screening: Unhealthy Alcohol Use - Screening: Patient not identified as an unhealthy alcohol user when screened for unhealthy alcohol use using a systematic screening method Quality 47: Advance Care Plan: Advance Care Planning discussed and documented in the medical record; patient did not wish or was not able to name a surrogate decision maker or provide an advance care plan. Quality 130: Documentation Of Current Medications In The Medical Record: Current Medications Documented Quality 226: Preventive Care And Screening: Tobacco Use: Screening And Cessation Intervention: Patient screened for tobacco use and is an ex/non-smoker